# Patient Record
Sex: FEMALE | Race: BLACK OR AFRICAN AMERICAN | Employment: OTHER | URBAN - METROPOLITAN AREA
[De-identification: names, ages, dates, MRNs, and addresses within clinical notes are randomized per-mention and may not be internally consistent; named-entity substitution may affect disease eponyms.]

---

## 2020-08-12 ENCOUNTER — OFFICE VISIT (OUTPATIENT)
Dept: CARDIOLOGY CLINIC | Age: 75
End: 2020-08-12
Payer: MEDICARE

## 2020-08-12 ENCOUNTER — TELEPHONE (OUTPATIENT)
Dept: CARDIOLOGY CLINIC | Age: 75
End: 2020-08-12

## 2020-08-12 VITALS
HEIGHT: 65 IN | WEIGHT: 202 LBS | SYSTOLIC BLOOD PRESSURE: 150 MMHG | BODY MASS INDEX: 33.66 KG/M2 | TEMPERATURE: 98 F | HEART RATE: 63 BPM | DIASTOLIC BLOOD PRESSURE: 90 MMHG

## 2020-08-12 PROCEDURE — 99205 OFFICE O/P NEW HI 60 MIN: CPT | Performed by: INTERNAL MEDICINE

## 2020-08-12 PROCEDURE — 93000 ELECTROCARDIOGRAM COMPLETE: CPT | Performed by: INTERNAL MEDICINE

## 2020-08-12 RX ORDER — FEXOFENADINE HCL 180 MG/1
180 TABLET ORAL PRN
COMMUNITY

## 2020-08-12 NOTE — TELEPHONE ENCOUNTER
Patient scheduled for COVID testing 8/14/20 [facility]) for CHAI 8/20/20 ([facility]). Patient notified to self-quarantine from day of testing until procedure. She states she understands and will comply.

## 2020-08-12 NOTE — PROGRESS NOTES
OFFICE CONSULT    Name: Abdifatah Ruth     Age: 70 y.o. Date of Service: 8/12/2020    Reason for Consultation: Valvular heart disease    Referring Physician: Farnaz Dial MD    History of Present Illness:   Ms. Radha Brown is a 70 y.o. old  female who presents today for further evaluation of valvular heart disease (including \"marked aortic regurgitation on 6/2020 echocardiogram). Her PMH is outlined below (see A/P). +dyspnea on exertion with moderate levels of exertion. She denies recent chest pain, palpitations, orthopnea, or syncope. Currently with no active cardiac complaints at rest. SR on EKG.     Review of Systems:   Cardiac: As per HPI  General: No fever, chills  Pulmonary: As per HPI  HEENT: No visual disturbances, difficult swallowing  GI: No nausea, vomiting, abdominal pain  Musculoskeletal: JOYNER x 4, no focal motor deficits  Skin: Intact, no rashes  Neuro/Psych: No headache or seizures    Past Medical History:  Past Medical History:   Diagnosis Date    Heart murmur     Hypertension     Night sweats    HTN  HLD  Obesity  Valvular heart disease    Past Surgical History:  No prior cardiac surgeries    Family History:  No [address] of premature CAD    Social History:  Social History     Socioeconomic History    Marital status: Single     Spouse name: Not on file    Number of children: Not on file    Years of education: Not on file    Highest education level: Not on file   Occupational History    Not on file   Social Needs    Financial resource strain: Not on file    Food insecurity     Worry: Not on file     Inability: Not on file   [employer] needs     Medical: Not on file     Non-medical: Not on file   Tobacco Use    Smoking status: Never Smoker    Smokeless tobacco: Never Used   Substance and Sexual Activity    Alcohol use: No    Drug use: No    Sexual activity: Yes   Lifestyle    Physical activity     Days per week: Not on file     Minutes per session: Not on file  Stress: Not on file   Relationships    Social connections     Talks on phone: Not on file     Gets together: Not on file     Attends [employer] service: Not on file     Active member of club or organization: Not on file     Attends meetings of clubs or organizations: Not on file     Relationship status: Not on file    Intimate partner violence     Fear of current or ex partner: Not on file     Emotionally abused: Not on file     Physically abused: Not on file     Forced sexual activity: Not on file   Other Topics Concern    Not on file   Social History Narrative    Not on file       Allergies: Allergies   Allergen Reactions    Dye [Iodides] Hives    Pcn [Penicillins] Rash       Current Medications:  Current Outpatient Medications   Medication Sig Dispense Refill    fexofenadine (ALLEGRA) 180 MG tablet Take 180 mg by mouth as needed      ammonium lactate (LAC-HYDRIN) 12 % lotion APPLY TOPICALLY 2 TIMES A DAY  5    lisinopril (PRINIVIL;ZESTRIL) 20 MG tablet   5    metoprolol succinate (TOPROL XL) 25 MG extended release tablet TAKE 1 TABLET BY MOUTH AT BEDTIME  5    spironolactone (ALDACTONE) 25 MG tablet TAKE 1 TABLET BY MOUTH EVERY DAY  5    acetaminophen (TYLENOL) 500 MG tablet Take 500 mg by mouth every 6 hours as needed for Pain.  simvastatin (ZOCOR) 40 MG tablet Take 40 mg by mouth nightly.  Ergocalciferol (VITAMIN D2 PO) Take  by mouth once a week.  montelukast (SINGULAIR) 10 MG tablet TAKE 1 TABLET BY MOUTH AT BEDTIME  5     No current facility-administered medications for this visit.           Physical Exam:  BP (!) 150/90   Pulse 63   Temp 98 °F (36.7 °C)   Ht 5' 5\" (1.651 m)   Wt 202 lb (91.6 kg)   BMI 33.61 kg/m²   Wt Readings from Last 3 Encounters:   08/12/20 202 lb (91.6 kg)   12/10/19 201 lb 12.8 oz (91.5 kg)   12/08/17 211 lb 3.2 oz (95.8 kg)     Appearance: Awake, alert, no acute respiratory distress  Skin: Intact, no rash  Head: Normocephalic, atraumatic  Eyes: EOMI, no conjunctival erythema  ENMT: No pharyngeal erythema, MMM, no rhinorrhea  Neck: Supple, no carotid bruits  Lungs: Clear to auscultation bilaterally. No wheezes, rales, or rhonchi. Cardiac: Regular rate and rhythm, 3/6 SHORTY > RUSB, 2/6 diastolic murmur > RUSB  Abdomen: Soft, nontender, +bowel sounds  Extremities: Moves all extremities x 4, no lower extremity edema  Neurologic: No focal motor deficits apparent, normal mood and affect    Laboratory Tests:  Lab Results   Component Value Date    CREATININE 1.3 (H) 07/06/2016    BUN 17 07/06/2016     07/06/2016    K 4.2 07/06/2016     07/06/2016    CO2 25 07/06/2016     Lab Results   Component Value Date    WBC 5.4 07/06/2016    RBC 5.00 07/06/2016    HGB 12.0 07/06/2016    HCT 38.0 07/06/2016    MCV 76.1 07/06/2016    MCH 24.0 07/06/2016    MCHC 31.6 07/06/2016    RDW 16.9 07/06/2016     07/06/2016    MPV 10.7 07/06/2016     Lab Results   Component Value Date    TSH 2.450 07/06/2016     Lab Results   Component Value Date    TRIG 73 07/06/2016    TRIG 65 03/06/2015    TRIG 52 08/22/2014     Lab Results   Component Value Date    HDL 71 07/06/2016    HDL 71 03/06/2015    HDL 69 08/22/2014     Lab Results   Component Value Date    LDLCALC 73 07/06/2016    [address] 60 03/06/2015    LDLCALC 52 08/22/2014       Cardiac Tests:  ECG: SR, rate 63, NSSTT changes    Echocardiogram: 10/18/13  EF 51%, moderate septal hypertrophy, mild AS, mild MR    Exercise Nuclear Stress Test: 10/15/13  EF 53%, reversible inferior defect, fixed apical defect    Echocardiogram: 6/26/2020 (Dr. Kelly Lee -- radiologist)  Normal LV size  EF 63%  Normal RV size and function  Mild LVH  Mild MR  Moderate aortic stenosis  Marked aortic regurgitation  Mild KS  Mild TR    ASSESSMENT / PLAN:  1. Valvular heart disease (including \"marked\" aortic regurgitation and moderate aortic stenosis on 6/2020 echocardiogram)  2. Dyspnea on exertion  3.  Reported abnormal stress test in 10/2013 (per patient, she was asymptomatic at that time and she deferred further work-up)  4. HTN  5. HLD  6.  BMI 33.6    - CHAI ordered today (will perform next week at [facility])  - Discussed re: CTS referral and cardiac catheterization if significant valve disease confirmed  - Continue current medications    Crystal Acevedo MD  [facility] ([facility]) Cardiology

## 2020-08-14 ENCOUNTER — HOSPITAL ENCOUNTER (OUTPATIENT)
Age: 75
Discharge: HOME | End: 2020-08-16
Payer: MEDICARE

## 2020-08-14 PROCEDURE — U0003 INFECTIOUS AGENT DETECTION BY NUCLEIC ACID (DNA OR RNA); SEVERE ACUTE RESPIRATORY SYNDROME CORONAVIRUS 2 (SARS-COV-2) (CORONAVIRUS DISEASE [COVID-19]), AMPLIFIED PROBE TECHNIQUE, MAKING USE OF HIGH THROUGHPUT TECHNOLOGIES AS DESCRIBED BY CMS-2020-01-R: HCPCS

## 2020-08-15 LAB
SARS-COV-2: NOT DETECTED
SOURCE: NORMAL

## 2020-08-19 ENCOUNTER — TELEPHONE (OUTPATIENT)
Dept: NON INVASIVE DIAGNOSTICS | Age: 75
End: 2020-08-19

## 2020-08-20 ENCOUNTER — ANESTHESIA (OUTPATIENT)
Dept: CARDIAC CATH/INVASIVE PROCEDURES | Age: 75
End: 2020-08-20

## 2020-08-20 ENCOUNTER — ANESTHESIA EVENT (OUTPATIENT)
Dept: CARDIAC CATH/INVASIVE PROCEDURES | Age: 75
End: 2020-08-20

## 2020-08-20 ENCOUNTER — HOSPITAL ENCOUNTER (OUTPATIENT)
Dept: CARDIAC CATH/INVASIVE PROCEDURES | Age: 75
Discharge: HOME | End: 2020-08-20
Payer: MEDICARE

## 2020-08-20 VITALS
OXYGEN SATURATION: 98 % | DIASTOLIC BLOOD PRESSURE: 71 MMHG | RESPIRATION RATE: 35 BRPM | SYSTOLIC BLOOD PRESSURE: 137 MMHG

## 2020-08-20 VITALS
BODY MASS INDEX: 33.66 KG/M2 | HEIGHT: 65 IN | RESPIRATION RATE: 18 BRPM | TEMPERATURE: 97.4 F | WEIGHT: 202 LBS | SYSTOLIC BLOOD PRESSURE: 190 MMHG | DIASTOLIC BLOOD PRESSURE: 81 MMHG | OXYGEN SATURATION: 98 % | HEART RATE: 82 BPM

## 2020-08-20 LAB
LV EF: 58 %
LVEF MODALITY: NORMAL

## 2020-08-20 PROCEDURE — 2580000003 HC RX 258: Performed by: INTERNAL MEDICINE

## 2020-08-20 PROCEDURE — 93320 DOPPLER ECHO COMPLETE: CPT | Performed by: INTERNAL MEDICINE

## 2020-08-20 PROCEDURE — 3700000001 HC ADD 15 MINUTES (ANESTHESIA)

## 2020-08-20 PROCEDURE — 93312 ECHO TRANSESOPHAGEAL: CPT

## 2020-08-20 PROCEDURE — 93321 DOPPLER ECHO F-UP/LMTD STD: CPT

## 2020-08-20 PROCEDURE — 6360000002 HC RX W HCPCS: Performed by: NURSE ANESTHETIST, CERTIFIED REGISTERED

## 2020-08-20 PROCEDURE — 93312 ECHO TRANSESOPHAGEAL: CPT | Performed by: INTERNAL MEDICINE

## 2020-08-20 PROCEDURE — 93325 DOPPLER ECHO COLOR FLOW MAPG: CPT

## 2020-08-20 PROCEDURE — 3700000000 HC ANESTHESIA ATTENDED CARE

## 2020-08-20 PROCEDURE — 93325 DOPPLER ECHO COLOR FLOW MAPG: CPT | Performed by: INTERNAL MEDICINE

## 2020-08-20 PROCEDURE — 2709999900 HC NON-CHARGEABLE SUPPLY

## 2020-08-20 RX ORDER — SODIUM CHLORIDE 9 MG/ML
INJECTION, SOLUTION INTRAVENOUS CONTINUOUS
Status: DISCONTINUED | OUTPATIENT
Start: 2020-08-20 | End: 2020-08-21 | Stop reason: HOSPADM

## 2020-08-20 RX ORDER — PROPOFOL 10 MG/ML
INJECTION, EMULSION INTRAVENOUS PRN
Status: DISCONTINUED | OUTPATIENT
Start: 2020-08-20 | End: 2020-08-20 | Stop reason: SDUPTHER

## 2020-08-20 RX ADMIN — PROPOFOL 220 MG: 10 INJECTION, EMULSION INTRAVENOUS at 10:23

## 2020-08-20 RX ADMIN — SODIUM CHLORIDE: 9 INJECTION, SOLUTION INTRAVENOUS at 08:30

## 2020-08-20 NOTE — ANESTHESIA PRE PROCEDURE
Department of Anesthesiology  Preprocedure Note       Name:  Alcide Mohs   Age:  70 y.o.  :  1945                                          MRN:  [id number]         Date:  2020      Surgeon: * Surgery not found *    Procedure:     Vital Signs (Current)   Vitals:    20 0812   BP: (!) 190/81   Pulse: 82   Resp: 18   Temp: 36.3 °C (97.4 °F)   SpO2: 98%     Vital Signs Statistics (for past 48 hrs)     Temp  Av.3 °C (97.4 °F)  Min: 36.3 °C (97.4 °F)   Min taken time: 20 7557  Max: 36.3 °C (97.4 °F)   Max taken time: 20 08  Pulse  Av  Min: 82   Min taken time: 20 08  Max: 82   Max taken time: 20 0812  Resp  Av  Min: 25   Min taken time: 20 08  Max: 18   Max taken time: 20 0812  BP  Min: 190/81   Min taken time: 20 0812  Max: 190/81   Max taken time: 20 08  SpO2  Av %  Min: 98 %   Min taken time: 20 08  Max: 98 %   Max taken time: 20 0812    BP Readings from Last 3 Encounters:   20 (!) 190/81   20 (!) 150/90   12/10/19 99/61     BMI  Body mass index is 33.61 kg/m². Estimated body mass index is 33.61 kg/m² as calculated from the following:    Height as of this encounter: 5' 5\" (1.651 m). Weight as of this encounter: 202 lb (91.6 kg).     CBC   Lab Results   Component Value Date    WBC 5.4 2016    RBC 5.00 2016    HGB 12.0 2016    HCT 38.0 2016    MCV 76.1 2016    RDW 16.9 2016     2016     CMP    Lab Results   Component Value Date     2016    K 4.2 2016     2016    CO2 25 2016    BUN 17 2016    CREATININE 1.3 2016    GFRAA 49 2016    LABGLOM 49 2016    GLUCOSE 87 2016    PROT 7.6 2016    CALCIUM 9.3 2016    BILITOT 0.5 2016    ALKPHOS 81 2016    AST 20 2016    ALT 15 2016     BMP    Lab Results   Component Value Date     2016    K 4.2 spironolactone (ALDACTONE) 25 MG tablet TAKE 1 TABLET BY MOUTH EVERY DAY  5    acetaminophen (TYLENOL) 500 MG tablet Take 500 mg by mouth every 6 hours as needed for Pain.  simvastatin (ZOCOR) 40 MG tablet Take 40 mg by mouth every other day       Ergocalciferol (VITAMIN D2 PO) Take  by mouth once a week.  ammonium lactate (LAC-HYDRIN) 12 % lotion APPLY TOPICALLY 2 TIMES A DAY  5     Current Facility-Administered Medications   Medication Dose Route Frequency Provider Last Rate Last Dose    0.9 % sodium chloride infusion   Intravenous Continuous Nina Mckenzie MD 20 mL/hr at 08/20/20 0830         Allergies: Allergies   Allergen Reactions    Dye [Iodides] Hives    Pcn [Penicillins] Rash       Problem List:  There is no problem list on file for this patient. Past Medical History:        Diagnosis Date    Arthritis     Heart murmur     Hyperlipidemia     Hypertension     Night sweats        Past Surgical History:        Procedure Laterality Date    CATARACT REMOVAL WITH IMPLANT  2010    Bilateral        Social History:    Social History     Tobacco Use    Smoking status: Never Smoker    Smokeless tobacco: Never Used   Substance Use Topics    Alcohol use: No                                Counseling given: Not Answered      Vital Signs (Current):   Vitals:    08/20/20 0812   BP: (!) 190/81   Pulse: 82   Resp: 18   Temp: 36.3 °C (97.4 °F)   TempSrc: Temporal   SpO2: 98%   Weight: 202 lb (91.6 kg)   Height: 5' 5\" (1.651 m)                                              BP Readings from Last 3 Encounters:   08/20/20 (!) 190/81   08/12/20 (!) 150/90   12/10/19 99/61       NPO Status:  8/19/20 2200                                                                               BMI:   Wt Readings from Last 3 Encounters:   08/20/20 202 lb (91.6 kg)   08/12/20 202 lb (91.6 kg)   12/10/19 201 lb 12.8 oz (91.5 kg)     Body mass index is 33.61 kg/m².     CBC:   Lab Results   Component Value Date    WBC 5.4 07/06/2016    RBC 5.00 07/06/2016    HGB 12.0 07/06/2016    HCT 38.0 07/06/2016    MCV 76.1 07/06/2016    RDW 16.9 07/06/2016     07/06/2016       CMP:   Lab Results   Component Value Date     07/06/2016    K 4.2 07/06/2016     07/06/2016    CO2 25 07/06/2016    BUN 17 07/06/2016    CREATININE 1.3 07/06/2016    GFRAA 49 07/06/2016    LABGLOM 49 07/06/2016    GLUCOSE 87 07/06/2016    PROT 7.6 07/06/2016    CALCIUM 9.3 07/06/2016    BILITOT 0.5 07/06/2016    ALKPHOS 81 07/06/2016    AST 20 07/06/2016    ALT 15 07/06/2016       POC Tests: No results for input(s): POCGLU, POCNA, POCK, POCCL, POCBUN, POCHEMO, POCHCT in the last 72 hours. Coags: No results found for: PROTIME, INR, APTT    HCG (If Applicable): No results found for: PREGTESTUR, PREGSERUM, HCG, HCGQUANT     ABGs: No results found for: PHART, PO2ART, IPD1VFQ, ROB8NIA, BEART, C6CTMAOY     Type & Screen (If Applicable):  No results found for: LABABO, LABRH    Drug/Infectious Status (If Applicable):  No results found for: HIV, HEPCAB    COVID-19 Screening (If Applicable):   Lab Results   Component Value Date    COVID19 Not Detected 08/14/2020         Anesthesia Evaluation  Patient summary reviewed and Nursing notes reviewed no history of anesthetic complications:   Airway: Mallampati: II  TM distance: >3 FB   Neck ROM: full  Mouth opening: > = 3 FB Dental:          Pulmonary: breath sounds clear to auscultation                             Cardiovascular:  Exercise tolerance: poor (<4 METS),   (+) hypertension:, valvular problems/murmurs: AI, hyperlipidemia               ROS comment: SOB on exertion. Neuro/Psych:               GI/Hepatic/Renal:             Endo/Other:    (+) : arthritis:., .                 Abdominal:           Vascular:                                      Anesthesia Plan      MAC     ASA 3     (#20 R hand)        Anesthetic plan and risks discussed with patient.     Use of blood products discussed with patient whom.   Plan discussed with attending.                 DAMIEN Miles - LUIS   8/20/2020

## 2020-08-20 NOTE — ANESTHESIA POSTPROCEDURE EVALUATION
Department of Anesthesiology  Postprocedure Note    Patient: Nunu Lantigua  MRN: [id number]  YOB: 1945  Date of evaluation: 8/20/2020  Time:  10:43 AM     Procedure Summary     Date:  08/20/20 Room / [facility]; [facility]    Anesthesia Start:  3422 Anesthesia Stop:  8723    Procedure:  SEY CHAI Diagnosis:  Heart disease, unspecified    Scheduled Providers:  Ardie Homans, APRN - CRNA; Jose G Hatfield MD Responsible Provider:  Jose G Hatfield MD    Anesthesia Type:  MAC ASA Status:  3          Anesthesia Type: MAC    Murphy Phase I:      Murphy Phase II:      Last vitals: Reviewed and per EMR flowsheets.        Anesthesia Post Evaluation    Patient location during evaluation: bedside  Patient participation: complete - patient participated  Level of consciousness: awake and alert  Airway patency: patent  Nausea & Vomiting: no nausea and no vomiting  Complications: no  Cardiovascular status: hemodynamically stable and blood pressure returned to baseline  Respiratory status: acceptable  Hydration status: euvolemic

## 2020-08-20 NOTE — PROCEDURES
PRELIMINARY TRANSESOPHAGEAL ECHOCARDIOGRAPHY REPORT    Cardiologist: Dr. Mariely Williamson    Date of Procedure: 8/20/2020    Indications for study:  AI, AS    Study performed using (Sedation): Per anesthesia    Complications: None    Preliminary findings:  Normal LV function   Severe AI  Moderate AS  Mild MR    Roselyn Byrnes MD  [facility]) Cardiology

## 2020-09-08 ENCOUNTER — TELEPHONE (OUTPATIENT)
Dept: NON INVASIVE DIAGNOSTICS | Age: 75
End: 2020-09-08

## 2020-09-08 NOTE — TELEPHONE ENCOUNTER
Patient scheduled on 9/10/20, given mask requirement and temperature check  instructions. Patient answered all checklist questions and appointment confirmed.

## 2020-09-10 ENCOUNTER — HOSPITAL ENCOUNTER (OUTPATIENT)
Dept: NON INVASIVE DIAGNOSTICS | Age: 75
Discharge: HOME | End: 2020-09-10
Payer: MEDICARE

## 2020-09-10 ENCOUNTER — HOSPITAL ENCOUNTER (OUTPATIENT)
Age: 75
Discharge: HOME | End: 2020-09-10
Payer: MEDICARE

## 2020-09-10 VITALS
HEART RATE: 80 BPM | WEIGHT: 215 LBS | TEMPERATURE: 96.6 F | RESPIRATION RATE: 20 BRPM | SYSTOLIC BLOOD PRESSURE: 155 MMHG | BODY MASS INDEX: 34.55 KG/M2 | DIASTOLIC BLOOD PRESSURE: 101 MMHG | HEIGHT: 66 IN

## 2020-09-10 LAB
ALBUMIN SERPL-MCNC: 4.4 G/DL (ref 3.5–5.2)
ALP BLD-CCNC: 92 U/L (ref 35–104)
ALT SERPL-CCNC: 19 U/L (ref 0–32)
ANION GAP SERPL CALCULATED.3IONS-SCNC: 16 MMOL/L (ref 7–16)
AST SERPL-CCNC: 19 U/L (ref 0–31)
BILIRUB SERPL-MCNC: 0.4 MG/DL (ref 0–1.2)
BUN BLDV-MCNC: 20 MG/DL (ref 8–23)
CALCIUM SERPL-MCNC: 10 MG/DL (ref 8.6–10.2)
CHLORIDE BLD-SCNC: 106 MMOL/L (ref 98–107)
CO2: 23 MMOL/L (ref 22–29)
CREAT SERPL-MCNC: 1.4 MG/DL (ref 0.5–1)
GFR AFRICAN AMERICAN: 45
GFR NON-AFRICAN AMERICAN: 45 ML/MIN/1.73
GLUCOSE BLD-MCNC: 100 MG/DL (ref 74–99)
HCT VFR BLD CALC: 39.9 % (ref 34–48)
HEMOGLOBIN: 12.2 G/DL (ref 11.5–15.5)
INR BLD: 1
MCH RBC QN AUTO: 24.8 PG (ref 26–35)
MCHC RBC AUTO-ENTMCNC: 30.6 % (ref 32–34.5)
MCV RBC AUTO: 81.1 FL (ref 80–99.9)
PDW BLD-RTO: 15.9 FL (ref 11.5–15)
PLATELET # BLD: 256 E9/L (ref 130–450)
PMV BLD AUTO: 11.3 FL (ref 7–12)
POTASSIUM SERPL-SCNC: 4.5 MMOL/L (ref 3.5–5)
PROTHROMBIN TIME: 11.7 SEC (ref 9.3–12.4)
RBC # BLD: 4.92 E12/L (ref 3.5–5.5)
SODIUM BLD-SCNC: 145 MMOL/L (ref 132–146)
TOTAL PROTEIN: 7.8 G/DL (ref 6.4–8.3)
WBC # BLD: 8.2 E9/L (ref 4.5–11.5)

## 2020-09-10 PROCEDURE — 85027 COMPLETE CBC AUTOMATED: CPT

## 2020-09-10 PROCEDURE — 99214 OFFICE O/P EST MOD 30 MIN: CPT | Performed by: INTERNAL MEDICINE

## 2020-09-10 PROCEDURE — 99211 OFF/OP EST MAY X REQ PHY/QHP: CPT

## 2020-09-10 PROCEDURE — 80053 COMPREHEN METABOLIC PANEL: CPT

## 2020-09-10 PROCEDURE — 36415 COLL VENOUS BLD VENIPUNCTURE: CPT

## 2020-09-10 PROCEDURE — 99204 OFFICE O/P NEW MOD 45 MIN: CPT | Performed by: THORACIC SURGERY (CARDIOTHORACIC VASCULAR SURGERY)

## 2020-09-10 PROCEDURE — 85610 PROTHROMBIN TIME: CPT

## 2020-09-10 NOTE — PROGRESS NOTES
[facility]  Visit Note      Patient name: Geo Chinchilla    Reason for consult: Aortic valve disease    Referring Physician: Dr. Akshat Sadler    Primary Care Physician: Tyron Clayton MD    Date of service: 9/10/2020    Chief Complaint: limited functional capacity    HPI: This is a pleasant 79-year-old -American female whom we are seeing for aortic valve disease. She was last seen by Dr. Akshat Sadler last month. She notes decreased exertional tolerance over the past 1 or 2 years. As a example she says that she cannot cut her grass anymore which she was able to do a year ago. She also cannot walk around as far and is limited to maybe 1 block currently due to dyspnea. She has no dyspnea at rest and no orthopnea or PND or lower extremity edema. She has no palpitations. She has orthostatic lightheadedness. She has no chest discomfort. She has no history of stroke or diabetes. A focused history review includes:  1. Hypertension  2. CKD with baseline creatinine of 1.3 as of 2016  3. Hyperlipidemia  4. Obesity    Allergies: Allergies   Allergen Reactions    Dye [Iodides] Hives    Pcn [Penicillins] Rash       Home medications:    Current Outpatient Medications   Medication Sig Dispense Refill    fexofenadine (ALLEGRA) 180 MG tablet Take 180 mg by mouth as needed      ammonium lactate (LAC-HYDRIN) 12 % lotion APPLY TOPICALLY 2 TIMES A DAY  5    lisinopril (PRINIVIL;ZESTRIL) 20 MG tablet   5    metoprolol succinate (TOPROL XL) 25 MG extended release tablet TAKE 1 TABLET BY MOUTH AT BEDTIME  5    spironolactone (ALDACTONE) 25 MG tablet TAKE 1 TABLET BY MOUTH EVERY DAY  5    acetaminophen (TYLENOL) 500 MG tablet Take 500 mg by mouth every 6 hours as needed for Pain.  simvastatin (ZOCOR) 40 MG tablet Take 40 mg by mouth every other day       Ergocalciferol (VITAMIN D2 PO) Take  by mouth once a week.        No current facility-administered medications for this encounter. Past Medical History:  Past Medical History:   Diagnosis Date    Aortic insufficiency 06/2020    Aortic stenosis 06/2020    Arthritis     Heart murmur     Hyperlipidemia     Hypertension     Night sweats        Past Surgical History:  Past Surgical History:   Procedure Laterality Date    CATARACT REMOVAL WITH IMPLANT  2010    Bilateral     TRANSESOPHAGEAL ECHOCARDIOGRAM  08/20/2020    DR Ocampo       Social History:  Social History     Socioeconomic History    Marital status: Single     Spouse name: Not on file    Number of children: Not on file    Years of education: Not on file    Highest education level: Not on file   Occupational History    Not on file   Social Needs    Financial resource strain: Not on file    Food insecurity     Worry: Not on file     Inability: Not on file    Transportation needs     Medical: Not on file     Non-medical: Not on file   Tobacco Use    Smoking status: Never Smoker    Smokeless tobacco: Never Used   Substance and Sexual Activity    Alcohol use: No    Drug use: No    Sexual activity: Yes   Lifestyle    Physical activity     Days per week: Not on file     Minutes per session: Not on file    Stress: Not on file   Relationships    Social connections     Talks on phone: Not on file     Gets together: Not on file     Attends [employer] service: Not on file     Active member of club or organization: Not on file     Attends meetings of clubs or organizations: Not on file     Relationship status: Not on file    Intimate partner violence     Fear of current or ex partner: Not on file     Emotionally abused: Not on file     Physically abused: Not on file     Forced sexual activity: Not on file   Other Topics Concern    Not on file   Social History Narrative    Not on file       Family History:  Family History   Family history unknown: Yes       Review of Systems:  Constitutional: Denies fevers, chills, or weight loss.   HEENT: Denies visual changes or hearing loss. Heart: As per HPI. Lungs: Denies shortness of breath, cough, or wheezing. Gastrointestinal: Denies nausea, vomiting, constipation, or diarrhea. Genitourinary: dysuria or hematuria. Psychiatric: Patient denies anxiety or depression. Neurologic: Patient denies weakness of the extremities, dizziness, or headaches. All other ROS checked and found to be negative. Objective:  Vitals BP (!) 155/101 (Site: Right Upper Arm)   Pulse 80   Temp 96.6 °F (35.9 °C) (Temporal)   Resp 20   Ht 5' 5.5\" (1.664 m)   Wt 215 lb (97.5 kg)   BMI 35.23 kg/m²   General Appearance: Pleasant 70y.o. year old female who appears stated age. Communicates well, no acute distress. HEENT: Head is normocephalic, atraumatic. EOMs intact, PERRL. Trachea midline. Lungs: Normal respiratory rate and normal effort. She is not in respiratory distress. Breath sounds clear to auscultation. No wheezes. Heart: Normal rate. Regular rhythm. 3/6 mid peaking systolic ejection murmur over the upper right sternal border with a muffled S2. There is a brief 1/6 early diastolic murmur over the left sternal border. Chest: Symmetric chest wall expansion. Extremities: Normal range of motion. Neurological: Patient is alert and oriented to person, place and time. Patient has normal reflexes. Skin: Warm and dry. Abdomen: Abdomen is soft and non-distended. Bowel sounds are normal. There is no abdominal tenderness tenderness. There is no guarding. There is no mass. Pulses: Distal pulses are intact. Skin: Warm and dry without lesions.     Lab Results   Component Value Date     07/06/2016    K 4.2 07/06/2016     07/06/2016    CO2 25 07/06/2016    BUN 17 07/06/2016    CREATININE 1.3 (H) 07/06/2016    GLUCOSE 87 07/06/2016    CALCIUM 9.3 07/06/2016    PROT 7.6 07/06/2016    LABALBU 4.0 07/06/2016    BILITOT 0.5 07/06/2016    ALKPHOS 81 07/06/2016    AST 20 07/06/2016    ALT 15 07/06/2016    LABGLOM 49

## 2020-09-10 NOTE — PROGRESS NOTES
The [address] [facility]  Visit Note      Patient name: Hafsa Barlow    Reason for consult: AS/AI    Referring Physician: Dr. Erika Pulido    Primary Care Physician: Marly Ivey MD    Date of service: 9/10/2020    Chief Complaint: GARCIA    HPI: 70year old female who was working in the yard and developed GARCIA which was relieved by rest and a fan on her face. She states this has only happened once and has no other issues. She had an echo which showed moderate AS and marked AI and CHAI done by Dr. Erika Pulido showed moderate AS with severe AI. She denies CP, SOB at rest, LE edema, N/V, F/C, night sweats, orthopnea, PND, and syncope. Allergies: Allergies   Allergen Reactions    Dye [Iodides] Hives    Pcn [Penicillins] Rash       Home medications:    Current Outpatient Medications   Medication Sig Dispense Refill    fexofenadine (ALLEGRA) 180 MG tablet Take 180 mg by mouth as needed      ammonium lactate (LAC-HYDRIN) 12 % lotion APPLY TOPICALLY 2 TIMES A DAY  5    lisinopril (PRINIVIL;ZESTRIL) 20 MG tablet   5    metoprolol succinate (TOPROL XL) 25 MG extended release tablet TAKE 1 TABLET BY MOUTH AT BEDTIME  5    spironolactone (ALDACTONE) 25 MG tablet TAKE 1 TABLET BY MOUTH EVERY DAY  5    acetaminophen (TYLENOL) 500 MG tablet Take 500 mg by mouth every 6 hours as needed for Pain.  simvastatin (ZOCOR) 40 MG tablet Take 40 mg by mouth every other day       Ergocalciferol (VITAMIN D2 PO) Take  by mouth once a week. No current facility-administered medications for this encounter.         Past Medical History:  Past Medical History:   Diagnosis Date    Aortic insufficiency 06/2020    Aortic stenosis 06/2020    Arthritis     Heart murmur     Hyperlipidemia     Hypertension     Night sweats        Past Surgical History:  Past Surgical History:   Procedure Laterality Date    CATARACT REMOVAL WITH IMPLANT  2010    Bilateral     TRANSESOPHAGEAL ECHOCARDIOGRAM  08/20/2020    DR Ocampo Social History:  Social History     Socioeconomic History    Marital status: Single     Spouse name: Not on file    Number of children: Not on file    Years of education: Not on file    Highest education level: Not on file   Occupational History    Not on file   Social Needs    Financial resource strain: Not on file    Food insecurity     Worry: Not on file     Inability: Not on file    Transportation needs     Medical: Not on file     Non-medical: Not on file   Tobacco Use    Smoking status: Never Smoker    Smokeless tobacco: Never Used   Substance and Sexual Activity    Alcohol use: No    Drug use: No    Sexual activity: Yes   Lifestyle    Physical activity     Days per week: Not on file     Minutes per session: Not on file    Stress: Not on file   Relationships    Social connections     Talks on phone: Not on file     Gets together: Not on file     Attends Baptism service: Not on file     Active member of club or organization: Not on file     Attends meetings of clubs or organizations: Not on file     Relationship status: Not on file    Intimate partner violence     Fear of current or ex partner: Not on file     Emotionally abused: Not on file     Physically abused: Not on file     Forced sexual activity: Not on file   Other Topics Concern    Not on file   Social History Narrative    Not on file       Family History:  Family History   Family history unknown: Yes       Review of Systems:  Constitutional: Denies fevers, chills, or weight loss. HEENT: Denies visual changes or hearing loss. Heart: As per HPI. Lungs: Denies shortness of breath, cough, or wheezing. Gastrointestinal: Denies nausea, vomiting, constipation, or diarrhea. Genitourinary: dysuria or hematuria. Psychiatric: Patient denies anxiety or depression. Neurologic: Patient denies weakness of the extremities, dizziness, or headaches. All other ROS checked and found to be negative.     Objective:  Vitals BP (!) 155/101 (Site: Right Upper Arm)   Pulse 80   Temp 96.6 °F (35.9 °C) (Temporal)   Resp 20   Ht 5' 5.5\" (1.664 m)   Wt 215 lb (97.5 kg)   BMI 35.23 kg/m²   General Appearance: Pleasant 70y.o. year old female who appears stated age. Communicates well, no acute distress. HEENT: Head is normocephalic, atraumatic. EOMs intact, PERRL. Trachea midline. Lungs: Normal respiratory rate and normal effort. She is not in respiratory distress. Breath sounds clear to auscultation. No wheezes. Heart: Normal rate. Regular rhythm. S1 normal and S2 normal. Positive for murmur. Chest: Symmetric chest wall expansion. Extremities: Normal range of motion. Neurological: Patient is alert and oriented to person, place and time. Patient has normal reflexes. Skin: Warm and dry. Abdomen: Abdomen is soft and non-distended. Bowel sounds are normal. There is no abdominal tenderness tenderness. There is no guarding. There is no mass. Pulses: Distal pulses are intact. Skin: Warm and dry without lesions. Assessment: AS/AI        Plan: Full pre op work up including cath, CTs, carotids, BRIA, and PFT then heart team discussion.         Electronically signed by Carlos Chau MD on 9/10/2020 at 1:30 PM

## 2020-09-16 RX ORDER — ASPIRIN 325 MG
325 TABLET, DELAYED RELEASE (ENTERIC COATED) ORAL SEE ADMIN INSTRUCTIONS
Qty: 1 TABLET | Refills: 0 | Status: SHIPPED
Start: 2020-09-16 | End: 2020-11-12

## 2020-09-16 RX ORDER — PREDNISONE 50 MG/1
50 TABLET ORAL SEE ADMIN INSTRUCTIONS
Qty: 3 TABLET | Refills: 0 | Status: SHIPPED
Start: 2020-09-16 | End: 2020-11-11 | Stop reason: SDUPTHER

## 2020-09-16 RX ORDER — DIPHENHYDRAMINE HCL 25 MG
50 TABLET ORAL SEE ADMIN INSTRUCTIONS
Qty: 2 TABLET | Refills: 0 | Status: SHIPPED
Start: 2020-09-16 | End: 2020-11-11 | Stop reason: SDUPTHER

## 2020-09-18 ENCOUNTER — TELEPHONE (OUTPATIENT)
Dept: CARDIOLOGY CLINIC | Age: 75
End: 2020-09-18

## 2020-09-18 ENCOUNTER — TELEPHONE (OUTPATIENT)
Dept: CARDIOLOGY | Age: 75
End: 2020-09-18

## 2020-09-18 NOTE — TELEPHONE ENCOUNTER
Cheikh Lezama called office  Needs teeth extraction Monday 9 21    Has LHC on Friday 9 25    Please advise her  If this is ok and any other orders       (AS- TAVR workup )  Thank you  Yesica Ghosh

## 2020-09-18 NOTE — TELEPHONE ENCOUNTER
Left  for patient - ok for tooth extraction on Monday (9/21/20) and heart cath on Friday (9/25/20). Advised her to call with any further questions; otherwise I will be in touch to schedule further testing once approved by insurance.

## 2020-09-25 ENCOUNTER — HOSPITAL ENCOUNTER (OUTPATIENT)
Dept: CARDIAC CATH/INVASIVE PROCEDURES | Age: 75
Discharge: HOME | End: 2020-09-25
Payer: MEDICARE

## 2020-09-25 VITALS
OXYGEN SATURATION: 96 % | WEIGHT: 197 LBS | HEIGHT: 65 IN | BODY MASS INDEX: 32.82 KG/M2 | TEMPERATURE: 96.7 F | DIASTOLIC BLOOD PRESSURE: 55 MMHG | RESPIRATION RATE: 16 BRPM | HEART RATE: 63 BPM | SYSTOLIC BLOOD PRESSURE: 138 MMHG

## 2020-09-25 LAB
ABO/RH: NORMAL
ANTIBODY SCREEN: NORMAL

## 2020-09-25 PROCEDURE — 93458 L HRT ARTERY/VENTRICLE ANGIO: CPT

## 2020-09-25 PROCEDURE — 86901 BLOOD TYPING SEROLOGIC RH(D): CPT

## 2020-09-25 PROCEDURE — 86850 RBC ANTIBODY SCREEN: CPT

## 2020-09-25 PROCEDURE — 93454 CORONARY ARTERY ANGIO S&I: CPT | Performed by: INTERNAL MEDICINE

## 2020-09-25 PROCEDURE — 36415 COLL VENOUS BLD VENIPUNCTURE: CPT

## 2020-09-25 PROCEDURE — 2709999900 HC NON-CHARGEABLE SUPPLY

## 2020-09-25 PROCEDURE — 86900 BLOOD TYPING SEROLOGIC ABO: CPT

## 2020-09-25 PROCEDURE — C1725 CATH, TRANSLUMIN NON-LASER: HCPCS

## 2020-09-25 PROCEDURE — C1769 GUIDE WIRE: HCPCS

## 2020-09-25 PROCEDURE — 6360000002 HC RX W HCPCS

## 2020-09-25 PROCEDURE — C1894 INTRO/SHEATH, NON-LASER: HCPCS

## 2020-09-25 PROCEDURE — 2500000003 HC RX 250 WO HCPCS

## 2020-09-25 RX ORDER — ACETAMINOPHEN 325 MG/1
650 TABLET ORAL EVERY 4 HOURS PRN
Status: DISCONTINUED | OUTPATIENT
Start: 2020-09-25 | End: 2020-09-26 | Stop reason: HOSPADM

## 2020-09-25 RX ORDER — SODIUM CHLORIDE 0.9 % (FLUSH) 0.9 %
10 SYRINGE (ML) INJECTION PRN
Status: DISCONTINUED | OUTPATIENT
Start: 2020-09-25 | End: 2020-09-26 | Stop reason: HOSPADM

## 2020-09-25 RX ORDER — SODIUM CHLORIDE 9 MG/ML
INJECTION, SOLUTION INTRAVENOUS ONCE
Status: DISCONTINUED | OUTPATIENT
Start: 2020-09-25 | End: 2020-09-26 | Stop reason: HOSPADM

## 2020-09-25 RX ORDER — SODIUM CHLORIDE 0.9 % (FLUSH) 0.9 %
10 SYRINGE (ML) INJECTION EVERY 12 HOURS SCHEDULED
Status: DISCONTINUED | OUTPATIENT
Start: 2020-09-25 | End: 2020-09-26 | Stop reason: HOSPADM

## 2020-09-25 NOTE — PROGRESS NOTES
Patient tolerated three packs of violette crackers and 120 cc of gingerale. Feels comfortable and doesn't wish to eat anything else at this time. Daughter in waiting room.

## 2020-09-25 NOTE — PROCEDURES
2. Heart team discussion regarding SAVR vs TAVR depending on results of CT scan.   Of note the patient is very adamant against open heart surgery      John Shaffer MD  Interventional Cardiology/Structural Heart Disease  Cell: [phone]

## 2020-09-25 NOTE — PROGRESS NOTES
Patient post heart cath right radial approach. Safety devices in place. Patient did not wish to order a breakfast try but wanted just violette crackers and gingerale. Denies pain. Reviewed post cath instructions regarding not using her right hand and wrist. Verbalized understanding.

## 2020-09-25 NOTE — PROGRESS NOTES
Discharge instructions reviewed with patient and daughter. Both verbalized understanding. No bleeding noted from right wrist.  Wrist immobilizer intact. Fingers warm with good sensation. Denies pain. Wheeled patient to car. Daughter driving.

## 2020-09-28 RX ORDER — DIPHENHYDRAMINE HCL 25 MG
50 TABLET ORAL ONCE
Qty: 2 TABLET | Refills: 0 | Status: SHIPPED | OUTPATIENT
Start: 2020-09-28 | End: 2020-09-28

## 2020-09-28 RX ORDER — PREDNISONE 50 MG/1
50 TABLET ORAL EVERY 6 HOURS
Qty: 3 TABLET | Refills: 0 | Status: SHIPPED | OUTPATIENT
Start: 2020-09-28 | End: 2020-09-29

## 2020-09-29 ENCOUNTER — TELEPHONE (OUTPATIENT)
Dept: CARDIOLOGY | Age: 75
End: 2020-09-29

## 2020-09-29 NOTE — TELEPHONE ENCOUNTER
Spoke to patient to schedule TAVR CT scans. She wishes to wait a couple of weeks from now, stating \"I think things are moving too quickly. \" she also wishes to delay TAVR/SAVR to November when her daughter is able to be in town. Will schedule CT for mid October and call her with date/time.

## 2020-10-13 ENCOUNTER — TELEPHONE (OUTPATIENT)
Dept: CARDIOLOGY | Age: 75
End: 2020-10-13

## 2020-10-13 NOTE — TELEPHONE ENCOUNTER
Patient notified of TAVR CT scans scheduled 10/15/20. Instructed to enter main lobby, report to registration 0800, CT 0830. NPO after midnight. Reviewed dye allergy premedication: prednisone 50 mg at 1930 10/14, 1230 10/15, 0730 10/15. bendryl 50 mg 0730 10/15/20. She states understanding.

## 2020-10-15 ENCOUNTER — HOSPITAL ENCOUNTER (OUTPATIENT)
Dept: CT IMAGING | Age: 75
Discharge: HOME | End: 2020-10-17
Payer: MEDICARE

## 2020-10-15 PROCEDURE — 71275 CT ANGIOGRAPHY CHEST: CPT

## 2020-10-15 PROCEDURE — 6360000004 HC RX CONTRAST MEDICATION: Performed by: RADIOLOGY

## 2020-10-15 PROCEDURE — 75572 CT HRT W/3D IMAGE: CPT

## 2020-10-15 PROCEDURE — 2580000003 HC RX 258: Performed by: RADIOLOGY

## 2020-10-15 PROCEDURE — 74174 CTA ABD&PLVS W/CONTRAST: CPT

## 2020-10-15 RX ORDER — SODIUM CHLORIDE 0.9 % (FLUSH) 0.9 %
10 SYRINGE (ML) INJECTION ONCE
Status: COMPLETED | OUTPATIENT
Start: 2020-10-15 | End: 2020-10-15

## 2020-10-15 RX ADMIN — Medication 10 ML: at 08:45

## 2020-10-15 RX ADMIN — IOPAMIDOL 100 ML: 755 INJECTION, SOLUTION INTRAVENOUS at 08:45

## 2020-10-15 NOTE — RESULT ENCOUNTER NOTE
TAVR CTAs personally reviewed:  Calcification: Mild to moderate leaflet calcification  Annular area: 536  Annular perimeter: 84  Annular dimensions: 23.5x28.7  LVOT dimensions: 22.5x27.8, area 513  - no calcium  Sinus of Valsalva dimensions: 34.1x35.4x35.6  STJ dimensions: 31.6x34.1  STJ height: 22.1  Left coronary height: 11  Right coronary height: 17.8  Implant angle: LAO2/CAU4  Annular angle to the horizontal plane: 46 degrees    Right iliofemoral: adequate  Left iliofemoral: adequate    Plan: 26-mm YAQUEILN 3 Ultra (nominal+2cc) via RIF cutdown

## 2020-11-09 NOTE — PROGRESS NOTES
Patient having covid testing at [facility] site on 11/13/20. Patient asked to bring ID and to self quarantine until day of procedure.

## 2020-11-11 ENCOUNTER — TELEPHONE (OUTPATIENT)
Dept: CARDIOLOGY | Age: 75
End: 2020-11-11

## 2020-11-11 RX ORDER — PREDNISONE 50 MG/1
TABLET ORAL
Qty: 3 TABLET | Refills: 0 | Status: SHIPPED | OUTPATIENT
Start: 2020-11-11 | End: 2020-11-12

## 2020-11-11 RX ORDER — DIPHENHYDRAMINE HCL 50 MG
CAPSULE ORAL
Qty: 1 CAPSULE | Refills: 0 | Status: SHIPPED | OUTPATIENT
Start: 2020-11-11 | End: 2020-11-12

## 2020-11-11 NOTE — PROGRESS NOTES
Patient notified of dye premedication (prednisone and diphenhydramine) escribed to pharmacy for TAVR scheduled 11/18/20. Also reviewed PAT and preop testing scheduled 11/16/20. She will call with any concerns in the meantime.

## 2020-11-11 NOTE — TELEPHONE ENCOUNTER
Spoke to nurse at Dr. Emile Crowley office re: soft tissue mass noted on CTA pelvis 10/15/20. She is able to see report in Epic and will be addressed at patient's office visit scheduled in December. Patient aware of findings as well.

## 2020-11-12 RX ORDER — ERGOCALCIFEROL 1.25 MG/1
50000 CAPSULE ORAL WEEKLY
COMMUNITY

## 2020-11-12 NOTE — PROGRESS NOTES
Stefan[facility] PRE-ADMISSION TESTING GENERAL INSTRUCTIONS- [facility]-phone number:[phone]    GENERAL INSTRUCTIONS. [x] Nothing by mouth after midnight, including gum, candy, mints, or water. [x] You may brush your teeth, gargle, but do NOT swallow water. [x]Hibiclens shower  the night before and the morning of surgery. Do not use             Hibiclens on your face or head. [x]No smoking, chewing tobacco, illegal drugs, or alcohol within 24 hours of your surgery. [x] Jewelry, valuables or body piercing's should not be brought to the hospital. All body and/or tongue piercing's must be removed prior to arriving to hospital.  ALL hair pins must be removed. [x] Do not wear makeup, lotions, powders, deodorant. Nail polish as directed by the nurse. [x] Arrange transportation with a responsible adult  to and from the hospital.   [x] Transfusion Bracelet: Please bring with you to hospital, day of surgery  [x] Bring copy of living will or healthcare power of  papers to be placed in your electronic record     PARKING INSTRUCTIONS:   [x] Arrival Time:_0500 for surgery 11-18 Wednesday with dr Ceasar Noyola / dr Gayle Plane [address] door          [x] To reach the [facility] from [facility], upon entering the hospital, take elevator B to the 3rd floor. EDUCATION INSTRUCTIONS:   .   [x] Pre-admission Testing educational folder given  [x] Incentive Spirometry,coughing & deep breathing exercises reviewed. [x]Fluoroscopy-Xray used in surgery reviewed with patient. Educational pamphlet placed in chart. [x]Pain: Post-op pain is normal and to be expected. You will be asked to rate your pain from 0-10(a zero is not acceptable-education is needed).  Your post-op pain goal is:  [x] Ask your nurse for your pain medication  [] Other:___________________________    MEDICATION INSTRUCTIONS:   [x]Bring a complete list of your medications, please write the last time you took the medicine, give

## 2020-11-13 ENCOUNTER — HOSPITAL ENCOUNTER (OUTPATIENT)
Age: 75
Discharge: HOME | End: 2020-11-15
Payer: MEDICARE

## 2020-11-13 ENCOUNTER — PREP FOR PROCEDURE (OUTPATIENT)
Dept: CARDIOLOGY | Age: 75
End: 2020-11-13

## 2020-11-13 PROCEDURE — U0003 INFECTIOUS AGENT DETECTION BY NUCLEIC ACID (DNA OR RNA); SEVERE ACUTE RESPIRATORY SYNDROME CORONAVIRUS 2 (SARS-COV-2) (CORONAVIRUS DISEASE [COVID-19]), AMPLIFIED PROBE TECHNIQUE, MAKING USE OF HIGH THROUGHPUT TECHNOLOGIES AS DESCRIBED BY CMS-2020-01-R: HCPCS

## 2020-11-13 RX ORDER — SODIUM CHLORIDE 0.9 % (FLUSH) 0.9 %
10 SYRINGE (ML) INJECTION EVERY 12 HOURS SCHEDULED
Status: CANCELLED | OUTPATIENT
Start: 2020-11-13

## 2020-11-13 RX ORDER — CHLORHEXIDINE GLUCONATE 4 G/100ML
SOLUTION TOPICAL ONCE
Status: CANCELLED | OUTPATIENT
Start: 2020-11-13 | End: 2020-11-13

## 2020-11-13 RX ORDER — SODIUM CHLORIDE 9 MG/ML
INJECTION, SOLUTION INTRAVENOUS CONTINUOUS
Status: CANCELLED | OUTPATIENT
Start: 2020-11-13

## 2020-11-13 RX ORDER — CHLORHEXIDINE GLUCONATE 0.12 MG/ML
15 RINSE ORAL ONCE
Status: CANCELLED | OUTPATIENT
Start: 2020-11-13 | End: 2020-11-13

## 2020-11-13 RX ORDER — SODIUM CHLORIDE 0.9 % (FLUSH) 0.9 %
10 SYRINGE (ML) INJECTION PRN
Status: CANCELLED | OUTPATIENT
Start: 2020-11-13

## 2020-11-15 LAB
SARS-COV-2: NOT DETECTED
SOURCE: NORMAL

## 2020-11-16 ENCOUNTER — HOSPITAL ENCOUNTER (OUTPATIENT)
Dept: PULMONOLOGY | Age: 75
Discharge: HOME | DRG: 267 | End: 2020-11-16
Payer: MEDICARE

## 2020-11-16 ENCOUNTER — HOSPITAL ENCOUNTER (OUTPATIENT)
Dept: ULTRASOUND IMAGING | Age: 75
Discharge: HOME | DRG: 267 | End: 2020-11-18
Payer: MEDICARE

## 2020-11-16 ENCOUNTER — HOSPITAL ENCOUNTER (OUTPATIENT)
Dept: GENERAL RADIOLOGY | Age: 75
Discharge: HOME | DRG: 267 | End: 2020-11-18
Payer: MEDICARE

## 2020-11-16 ENCOUNTER — HOSPITAL ENCOUNTER (OUTPATIENT)
Dept: PREADMISSION TESTING | Age: 75
Setting detail: SURGERY ADMIT
Discharge: HOME | DRG: 267 | End: 2020-11-16
Payer: MEDICARE

## 2020-11-16 VITALS
RESPIRATION RATE: 16 BRPM | HEIGHT: 65 IN | HEART RATE: 85 BPM | WEIGHT: 199.38 LBS | TEMPERATURE: 98.3 F | BODY MASS INDEX: 33.22 KG/M2 | DIASTOLIC BLOOD PRESSURE: 60 MMHG | SYSTOLIC BLOOD PRESSURE: 129 MMHG | OXYGEN SATURATION: 99 %

## 2020-11-16 LAB
ABO/RH: NORMAL
ALBUMIN SERPL-MCNC: 4 G/DL (ref 3.5–5.2)
ALP BLD-CCNC: 86 U/L (ref 35–104)
ALT SERPL-CCNC: 13 U/L (ref 0–32)
ANION GAP SERPL CALCULATED.3IONS-SCNC: 12 MMOL/L (ref 7–16)
ANTIBODY SCREEN: NORMAL
APTT: 29.4 SEC (ref 24.5–35.1)
AST SERPL-CCNC: 17 U/L (ref 0–31)
BILIRUB SERPL-MCNC: 0.4 MG/DL (ref 0–1.2)
BILIRUBIN URINE: NEGATIVE
BLOOD, URINE: NEGATIVE
BUN BLDV-MCNC: 18 MG/DL (ref 8–23)
CALCIUM SERPL-MCNC: 9.6 MG/DL (ref 8.6–10.2)
CHLORIDE BLD-SCNC: 106 MMOL/L (ref 98–107)
CLARITY: CLEAR
CO2: 26 MMOL/L (ref 22–29)
COLOR: YELLOW
CREAT SERPL-MCNC: 1.3 MG/DL (ref 0.5–1)
GFR AFRICAN AMERICAN: 48
GFR NON-AFRICAN AMERICAN: 48 ML/MIN/1.73
GLUCOSE BLD-MCNC: 119 MG/DL (ref 74–99)
GLUCOSE URINE: NEGATIVE MG/DL
HBA1C MFR BLD: 6.1 % (ref 4–5.6)
HCT VFR BLD CALC: 38.8 % (ref 34–48)
HEMOGLOBIN: 12.1 G/DL (ref 11.5–15.5)
INR BLD: 1
KETONES, URINE: ABNORMAL MG/DL
LEUKOCYTE ESTERASE, URINE: NEGATIVE
MCH RBC QN AUTO: 24.8 PG (ref 26–35)
MCHC RBC AUTO-ENTMCNC: 31.2 % (ref 32–34.5)
MCV RBC AUTO: 79.5 FL (ref 80–99.9)
NITRITE, URINE: NEGATIVE
PDW BLD-RTO: 15.5 FL (ref 11.5–15)
PH UA: 5.5 (ref 5–9)
PLATELET # BLD: 249 E9/L (ref 130–450)
PMV BLD AUTO: 11.5 FL (ref 7–12)
POTASSIUM SERPL-SCNC: 4.1 MMOL/L (ref 3.5–5)
PROTEIN UA: NEGATIVE MG/DL
PROTHROMBIN TIME: 11.4 SEC (ref 9.3–12.4)
RBC # BLD: 4.88 E12/L (ref 3.5–5.5)
SODIUM BLD-SCNC: 144 MMOL/L (ref 132–146)
SPECIFIC GRAVITY UA: >=1.03 (ref 1–1.03)
TOTAL PROTEIN: 7.6 G/DL (ref 6.4–8.3)
UROBILINOGEN, URINE: 0.2 E.U./DL
WBC # BLD: 7.8 E9/L (ref 4.5–11.5)

## 2020-11-16 PROCEDURE — 93880 EXTRACRANIAL BILAT STUDY: CPT | Performed by: RADIOLOGY

## 2020-11-16 PROCEDURE — 94010 BREATHING CAPACITY TEST: CPT | Performed by: INTERNAL MEDICINE

## 2020-11-16 PROCEDURE — 81003 URINALYSIS AUTO W/O SCOPE: CPT

## 2020-11-16 PROCEDURE — 94375 RESPIRATORY FLOW VOLUME LOOP: CPT

## 2020-11-16 PROCEDURE — 71046 X-RAY EXAM CHEST 2 VIEWS: CPT

## 2020-11-16 PROCEDURE — 93880 EXTRACRANIAL BILAT STUDY: CPT

## 2020-11-16 PROCEDURE — 85730 THROMBOPLASTIN TIME PARTIAL: CPT

## 2020-11-16 PROCEDURE — 93005 ELECTROCARDIOGRAM TRACING: CPT

## 2020-11-16 PROCEDURE — 85027 COMPLETE CBC AUTOMATED: CPT

## 2020-11-16 PROCEDURE — 80053 COMPREHEN METABOLIC PANEL: CPT

## 2020-11-16 PROCEDURE — 85610 PROTHROMBIN TIME: CPT

## 2020-11-16 PROCEDURE — 36415 COLL VENOUS BLD VENIPUNCTURE: CPT

## 2020-11-16 PROCEDURE — 94729 DIFFUSING CAPACITY: CPT

## 2020-11-16 PROCEDURE — 86901 BLOOD TYPING SEROLOGIC RH(D): CPT

## 2020-11-16 PROCEDURE — 86850 RBC ANTIBODY SCREEN: CPT

## 2020-11-16 PROCEDURE — 86900 BLOOD TYPING SEROLOGIC ABO: CPT

## 2020-11-16 PROCEDURE — 87081 CULTURE SCREEN ONLY: CPT

## 2020-11-16 PROCEDURE — 83036 HEMOGLOBIN GLYCOSYLATED A1C: CPT

## 2020-11-16 PROCEDURE — 86923 COMPATIBILITY TEST ELECTRIC: CPT

## 2020-11-16 PROCEDURE — 87088 URINE BACTERIA CULTURE: CPT

## 2020-11-16 RX ORDER — DIPHENHYDRAMINE HCL 25 MG
25 CAPSULE ORAL ONCE
COMMUNITY
End: 2021-11-23 | Stop reason: ALTCHOICE

## 2020-11-17 ENCOUNTER — TELEPHONE (OUTPATIENT)
Dept: CARDIOLOGY | Age: 75
End: 2020-11-17

## 2020-11-17 ENCOUNTER — ANESTHESIA EVENT (OUTPATIENT)
Dept: OPERATING ROOM | Age: 75
DRG: 267 | End: 2020-11-17
Payer: MEDICARE

## 2020-11-17 LAB
EKG ATRIAL RATE: 87 BPM
EKG P AXIS: 31 DEGREES
EKG P-R INTERVAL: 130 MS
EKG Q-T INTERVAL: 388 MS
EKG QRS DURATION: 90 MS
EKG QTC CALCULATION (BAZETT): 466 MS
EKG R AXIS: 24 DEGREES
EKG T AXIS: 87 DEGREES
EKG VENTRICULAR RATE: 87 BPM
URINE CULTURE, ROUTINE: NORMAL

## 2020-11-17 PROCEDURE — 93010 ELECTROCARDIOGRAM REPORT: CPT | Performed by: INTERNAL MEDICINE

## 2020-11-17 NOTE — TELEPHONE ENCOUNTER
Reviewed dye allergy prep with patient. Instructed to take prednisone 1830 andd 2330 today, then 0630 tomorrow. Also benadryl 0630 tomorrow. She states understanding.

## 2020-11-17 NOTE — PROCEDURES
[address]                  [address] [facility]                               PULMONARY FUNCTION    PATIENT NAME: Hilary Reveles                   :        1945  MED REC NO:   [id number]                            ROOM:  ACCOUNT NO:   [de-identified]                           ADMIT DATE: 2020  PROVIDER:     Jose Lawson DO    DATE OF PROCEDURE:  2020    A 81-year-old female, 65 inches, 200 pounds. PREOPERATIVE ASSESSMENT:  Spirometry was performed, reveals a forced  vital capacity of 0.33 liters, 96% of predicted, FEV1 of 2.13 liters,  113% of predicted and FEV1/FVC ratio of 92%. Midflow rates are normal.   The maximum voluntary ventilation of 80 liters per minute, 93% of  predicted. The patient had difficulty performing the diffusion  assessment, thus did not report as inaccuracy was present. IMPRESSION:  Normal spirometry. Clinical correlation needed.         Sha Kapoor DO    D: 2020 16:15:08       T: 2020 16:18:39     [id number]  Job#: [id number]     Doc#: [id number]    CC:

## 2020-11-18 ENCOUNTER — ANESTHESIA (OUTPATIENT)
Dept: OPERATING ROOM | Age: 75
DRG: 267 | End: 2020-11-18
Payer: MEDICARE

## 2020-11-18 ENCOUNTER — HOSPITAL ENCOUNTER (INPATIENT)
Age: 75
LOS: 1 days | Discharge: HOME | DRG: 267 | End: 2020-11-19
Attending: INTERNAL MEDICINE | Admitting: INTERNAL MEDICINE
Payer: MEDICARE

## 2020-11-18 VITALS
SYSTOLIC BLOOD PRESSURE: 196 MMHG | OXYGEN SATURATION: 99 % | RESPIRATION RATE: 16 BRPM | DIASTOLIC BLOOD PRESSURE: 81 MMHG

## 2020-11-18 PROBLEM — I35.0 SEVERE AORTIC STENOSIS: Status: ACTIVE | Noted: 2020-11-18

## 2020-11-18 LAB
ANION GAP SERPL CALCULATED.3IONS-SCNC: 11 MMOL/L (ref 7–16)
BUN BLDV-MCNC: 24 MG/DL (ref 8–23)
CALCIUM SERPL-MCNC: 8.9 MG/DL (ref 8.6–10.2)
CHLORIDE BLD-SCNC: 105 MMOL/L (ref 98–107)
CO2: 21 MMOL/L (ref 22–29)
CREAT SERPL-MCNC: 1.1 MG/DL (ref 0.5–1)
GFR AFRICAN AMERICAN: 59
GFR NON-AFRICAN AMERICAN: 59 ML/MIN/1.73
GLUCOSE BLD-MCNC: 207 MG/DL (ref 74–99)
HCT VFR BLD CALC: 36.4 % (ref 34–48)
HEMOGLOBIN: 11.7 G/DL (ref 11.5–15.5)
MCH RBC QN AUTO: 25.3 PG (ref 26–35)
MCHC RBC AUTO-ENTMCNC: 32.1 % (ref 32–34.5)
MCV RBC AUTO: 78.8 FL (ref 80–99.9)
METER GLUCOSE: 170 MG/DL (ref 74–99)
METER GLUCOSE: 172 MG/DL (ref 74–99)
METER GLUCOSE: 174 MG/DL (ref 74–99)
MRSA CULTURE ONLY: NORMAL
PDW BLD-RTO: 15.6 FL (ref 11.5–15)
PLATELET # BLD: 225 E9/L (ref 130–450)
PMV BLD AUTO: 11.5 FL (ref 7–12)
POTASSIUM SERPL-SCNC: 3.7 MMOL/L (ref 3.5–5)
POTASSIUM SERPL-SCNC: 3.8 MMOL/L (ref 3.5–5)
RBC # BLD: 4.62 E12/L (ref 3.5–5.5)
SODIUM BLD-SCNC: 137 MMOL/L (ref 132–146)
WBC # BLD: 10.1 E9/L (ref 4.5–11.5)

## 2020-11-18 PROCEDURE — 02RF38Z REPLACEMENT OF AORTIC VALVE WITH ZOOPLASTIC TISSUE, PERCUTANEOUS APPROACH: ICD-10-PCS | Performed by: THORACIC SURGERY (CARDIOTHORACIC VASCULAR SURGERY)

## 2020-11-18 PROCEDURE — 6360000002 HC RX W HCPCS

## 2020-11-18 PROCEDURE — C1769 GUIDE WIRE: HCPCS | Performed by: INTERNAL MEDICINE

## 2020-11-18 PROCEDURE — 3600000018 HC SURGERY OHS ADDTL 15MIN: Performed by: INTERNAL MEDICINE

## 2020-11-18 PROCEDURE — 3700000000 HC ANESTHESIA ATTENDED CARE: Performed by: INTERNAL MEDICINE

## 2020-11-18 PROCEDURE — 6370000000 HC RX 637 (ALT 250 FOR IP): Performed by: PHYSICIAN ASSISTANT

## 2020-11-18 PROCEDURE — 3700000001 HC ADD 15 MINUTES (ANESTHESIA): Performed by: INTERNAL MEDICINE

## 2020-11-18 PROCEDURE — 93325 DOPPLER ECHO COLOR FLOW MAPG: CPT

## 2020-11-18 PROCEDURE — 93312 ECHO TRANSESOPHAGEAL: CPT

## 2020-11-18 PROCEDURE — 6370000000 HC RX 637 (ALT 250 FOR IP): Performed by: INTERNAL MEDICINE

## 2020-11-18 PROCEDURE — B3101ZZ FLUOROSCOPY OF THORACIC AORTA USING LOW OSMOLAR CONTRAST: ICD-10-PCS | Performed by: THORACIC SURGERY (CARDIOTHORACIC VASCULAR SURGERY)

## 2020-11-18 PROCEDURE — C9248 INJ, CLEVIDIPINE BUTYRATE: HCPCS | Performed by: NURSE PRACTITIONER

## 2020-11-18 PROCEDURE — 36415 COLL VENOUS BLD VENIPUNCTURE: CPT

## 2020-11-18 PROCEDURE — 85027 COMPLETE CBC AUTOMATED: CPT

## 2020-11-18 PROCEDURE — 2709999900 HC NON-CHARGEABLE SUPPLY: Performed by: INTERNAL MEDICINE

## 2020-11-18 PROCEDURE — 6360000002 HC RX W HCPCS: Performed by: PHYSICIAN ASSISTANT

## 2020-11-18 PROCEDURE — 2580000003 HC RX 258: Performed by: PHYSICIAN ASSISTANT

## 2020-11-18 PROCEDURE — C1769 GUIDE WIRE: HCPCS

## 2020-11-18 PROCEDURE — 2500000003 HC RX 250 WO HCPCS

## 2020-11-18 PROCEDURE — 85347 COAGULATION TIME ACTIVATED: CPT

## 2020-11-18 PROCEDURE — 33362 REPLACE AORTIC VALVE OPEN: CPT | Performed by: INTERNAL MEDICINE

## 2020-11-18 PROCEDURE — 2780000006 HC MISC HEART VALVE: Performed by: INTERNAL MEDICINE

## 2020-11-18 PROCEDURE — 2000000000 HC ICU R&B

## 2020-11-18 PROCEDURE — C1894 INTRO/SHEATH, NON-LASER: HCPCS | Performed by: INTERNAL MEDICINE

## 2020-11-18 PROCEDURE — 6360000002 HC RX W HCPCS: Performed by: NURSE PRACTITIONER

## 2020-11-18 PROCEDURE — 2580000003 HC RX 258

## 2020-11-18 PROCEDURE — 93321 DOPPLER ECHO F-UP/LMTD STD: CPT

## 2020-11-18 PROCEDURE — 2500000003 HC RX 250 WO HCPCS: Performed by: THORACIC SURGERY (CARDIOTHORACIC VASCULAR SURGERY)

## 2020-11-18 PROCEDURE — 82962 GLUCOSE BLOOD TEST: CPT

## 2020-11-18 PROCEDURE — C1725 CATH, TRANSLUMIN NON-LASER: HCPCS | Performed by: INTERNAL MEDICINE

## 2020-11-18 PROCEDURE — C1760 CLOSURE DEV, VASC: HCPCS | Performed by: INTERNAL MEDICINE

## 2020-11-18 PROCEDURE — 33362 REPLACE AORTIC VALVE OPEN: CPT | Performed by: THORACIC SURGERY (CARDIOTHORACIC VASCULAR SURGERY)

## 2020-11-18 PROCEDURE — 80048 BASIC METABOLIC PNL TOTAL CA: CPT

## 2020-11-18 PROCEDURE — 84132 ASSAY OF SERUM POTASSIUM: CPT

## 2020-11-18 PROCEDURE — 3600000008 HC SURGERY OHS BASE: Performed by: INTERNAL MEDICINE

## 2020-11-18 DEVICE — IMPLANTABLE DEVICE: Type: IMPLANTABLE DEVICE | Status: FUNCTIONAL

## 2020-11-18 RX ORDER — PROTAMINE SULFATE 10 MG/ML
INJECTION, SOLUTION INTRAVENOUS PRN
Status: DISCONTINUED | OUTPATIENT
Start: 2020-11-18 | End: 2020-11-18 | Stop reason: SDUPTHER

## 2020-11-18 RX ORDER — MIDAZOLAM HYDROCHLORIDE 1 MG/ML
INJECTION INTRAMUSCULAR; INTRAVENOUS PRN
Status: DISCONTINUED | OUTPATIENT
Start: 2020-11-18 | End: 2020-11-18 | Stop reason: SDUPTHER

## 2020-11-18 RX ORDER — PHENYLEPHRINE HYDROCHLORIDE 10 MG/ML
INJECTION INTRAVENOUS PRN
Status: DISCONTINUED | OUTPATIENT
Start: 2020-11-18 | End: 2020-11-18 | Stop reason: SDUPTHER

## 2020-11-18 RX ORDER — ACETAMINOPHEN 500 MG
500 TABLET ORAL EVERY 6 HOURS PRN
Status: DISCONTINUED | OUTPATIENT
Start: 2020-11-18 | End: 2020-11-19 | Stop reason: HOSPADM

## 2020-11-18 RX ORDER — CHLORHEXIDINE GLUCONATE 4 G/100ML
SOLUTION TOPICAL ONCE
Status: COMPLETED | OUTPATIENT
Start: 2020-11-18 | End: 2020-11-18

## 2020-11-18 RX ORDER — LISINOPRIL 20 MG/1
20 TABLET ORAL DAILY
Status: DISCONTINUED | OUTPATIENT
Start: 2020-11-18 | End: 2020-11-18 | Stop reason: SDUPTHER

## 2020-11-18 RX ORDER — DEXTROSE MONOHYDRATE 25 G/50ML
12.5 INJECTION, SOLUTION INTRAVENOUS PRN
Status: DISCONTINUED | OUTPATIENT
Start: 2020-11-18 | End: 2020-11-19 | Stop reason: HOSPADM

## 2020-11-18 RX ORDER — AMLODIPINE BESYLATE 5 MG/1
5 TABLET ORAL DAILY
Status: DISCONTINUED | OUTPATIENT
Start: 2020-11-18 | End: 2020-11-19 | Stop reason: HOSPADM

## 2020-11-18 RX ORDER — NEOSTIGMINE METHYLSULFATE 1 MG/ML
INJECTION, SOLUTION INTRAVENOUS PRN
Status: DISCONTINUED | OUTPATIENT
Start: 2020-11-18 | End: 2020-11-18 | Stop reason: SDUPTHER

## 2020-11-18 RX ORDER — CLOPIDOGREL BISULFATE 75 MG/1
75 TABLET ORAL DAILY
Status: DISCONTINUED | OUTPATIENT
Start: 2020-11-18 | End: 2020-11-19 | Stop reason: HOSPADM

## 2020-11-18 RX ORDER — PROTAMINE SULFATE 10 MG/ML
50 INJECTION, SOLUTION INTRAVENOUS
Status: ACTIVE | OUTPATIENT
Start: 2020-11-18 | End: 2020-11-18

## 2020-11-18 RX ORDER — ROCURONIUM BROMIDE 10 MG/ML
INJECTION, SOLUTION INTRAVENOUS PRN
Status: DISCONTINUED | OUTPATIENT
Start: 2020-11-18 | End: 2020-11-18 | Stop reason: SDUPTHER

## 2020-11-18 RX ORDER — SODIUM CHLORIDE 9 MG/ML
INJECTION, SOLUTION INTRAVENOUS CONTINUOUS PRN
Status: DISCONTINUED | OUTPATIENT
Start: 2020-11-18 | End: 2020-11-18 | Stop reason: SDUPTHER

## 2020-11-18 RX ORDER — DEXAMETHASONE SODIUM PHOSPHATE 10 MG/ML
INJECTION, SOLUTION INTRAMUSCULAR; INTRAVENOUS PRN
Status: DISCONTINUED | OUTPATIENT
Start: 2020-11-18 | End: 2020-11-18 | Stop reason: SDUPTHER

## 2020-11-18 RX ORDER — GLYCOPYRROLATE 1 MG/5 ML
SYRINGE (ML) INTRAVENOUS PRN
Status: DISCONTINUED | OUTPATIENT
Start: 2020-11-18 | End: 2020-11-18 | Stop reason: SDUPTHER

## 2020-11-18 RX ORDER — LISINOPRIL 20 MG/1
20 TABLET ORAL 2 TIMES DAILY
Status: DISCONTINUED | OUTPATIENT
Start: 2020-11-18 | End: 2020-11-19 | Stop reason: HOSPADM

## 2020-11-18 RX ORDER — SODIUM CHLORIDE 9 MG/ML
INJECTION, SOLUTION INTRAVENOUS CONTINUOUS
Status: DISCONTINUED | OUTPATIENT
Start: 2020-11-18 | End: 2020-11-18

## 2020-11-18 RX ORDER — SODIUM CHLORIDE 0.9 % (FLUSH) 0.9 %
10 SYRINGE (ML) INJECTION EVERY 12 HOURS SCHEDULED
Status: DISCONTINUED | OUTPATIENT
Start: 2020-11-18 | End: 2020-11-19 | Stop reason: HOSPADM

## 2020-11-18 RX ORDER — ATORVASTATIN CALCIUM 20 MG/1
20 TABLET, FILM COATED ORAL DAILY
Status: DISCONTINUED | OUTPATIENT
Start: 2020-11-18 | End: 2020-11-19 | Stop reason: HOSPADM

## 2020-11-18 RX ORDER — OXYCODONE HYDROCHLORIDE AND ACETAMINOPHEN 5; 325 MG/1; MG/1
1 TABLET ORAL EVERY 4 HOURS PRN
Status: DISCONTINUED | OUTPATIENT
Start: 2020-11-18 | End: 2020-11-19 | Stop reason: HOSPADM

## 2020-11-18 RX ORDER — ASPIRIN 81 MG/1
81 TABLET ORAL DAILY
Status: DISCONTINUED | OUTPATIENT
Start: 2020-11-18 | End: 2020-11-18

## 2020-11-18 RX ORDER — PROMETHAZINE HYDROCHLORIDE 25 MG/ML
6.25 INJECTION, SOLUTION INTRAMUSCULAR; INTRAVENOUS
Status: DISCONTINUED | OUTPATIENT
Start: 2020-11-18 | End: 2020-11-18

## 2020-11-18 RX ORDER — SODIUM CHLORIDE 0.9 % (FLUSH) 0.9 %
10 SYRINGE (ML) INJECTION PRN
Status: DISCONTINUED | OUTPATIENT
Start: 2020-11-18 | End: 2020-11-18

## 2020-11-18 RX ORDER — SODIUM CHLORIDE 0.9 % (FLUSH) 0.9 %
10 SYRINGE (ML) INJECTION PRN
Status: DISCONTINUED | OUTPATIENT
Start: 2020-11-18 | End: 2020-11-19 | Stop reason: HOSPADM

## 2020-11-18 RX ORDER — SPIRONOLACTONE 25 MG/1
25 TABLET ORAL DAILY
Status: DISCONTINUED | OUTPATIENT
Start: 2020-11-18 | End: 2020-11-19 | Stop reason: HOSPADM

## 2020-11-18 RX ORDER — DEXTROSE MONOHYDRATE 50 MG/ML
100 INJECTION, SOLUTION INTRAVENOUS PRN
Status: DISCONTINUED | OUTPATIENT
Start: 2020-11-18 | End: 2020-11-19 | Stop reason: HOSPADM

## 2020-11-18 RX ORDER — OLOPATADINE HYDROCHLORIDE 1 MG/ML
1 SOLUTION/ DROPS OPHTHALMIC DAILY PRN
Status: DISCONTINUED | OUTPATIENT
Start: 2020-11-18 | End: 2020-11-19 | Stop reason: HOSPADM

## 2020-11-18 RX ORDER — LIDOCAINE HYDROCHLORIDE 20 MG/ML
INJECTION, SOLUTION INFILTRATION; PERINEURAL PRN
Status: DISCONTINUED | OUTPATIENT
Start: 2020-11-18 | End: 2020-11-18 | Stop reason: SDUPTHER

## 2020-11-18 RX ORDER — ETOMIDATE 2 MG/ML
INJECTION INTRAVENOUS PRN
Status: DISCONTINUED | OUTPATIENT
Start: 2020-11-18 | End: 2020-11-18 | Stop reason: SDUPTHER

## 2020-11-18 RX ORDER — NICOTINE POLACRILEX 4 MG
15 LOZENGE BUCCAL PRN
Status: DISCONTINUED | OUTPATIENT
Start: 2020-11-18 | End: 2020-11-19 | Stop reason: HOSPADM

## 2020-11-18 RX ORDER — FENTANYL CITRATE 50 UG/ML
INJECTION, SOLUTION INTRAMUSCULAR; INTRAVENOUS PRN
Status: DISCONTINUED | OUTPATIENT
Start: 2020-11-18 | End: 2020-11-18 | Stop reason: SDUPTHER

## 2020-11-18 RX ORDER — ONDANSETRON 2 MG/ML
4 INJECTION INTRAMUSCULAR; INTRAVENOUS
Status: DISCONTINUED | OUTPATIENT
Start: 2020-11-18 | End: 2020-11-18

## 2020-11-18 RX ORDER — ONDANSETRON 2 MG/ML
INJECTION INTRAMUSCULAR; INTRAVENOUS PRN
Status: DISCONTINUED | OUTPATIENT
Start: 2020-11-18 | End: 2020-11-18 | Stop reason: SDUPTHER

## 2020-11-18 RX ORDER — METOPROLOL SUCCINATE 50 MG/1
25 TABLET, EXTENDED RELEASE ORAL NIGHTLY
Status: DISCONTINUED | OUTPATIENT
Start: 2020-11-18 | End: 2020-11-19

## 2020-11-18 RX ORDER — HYDRALAZINE HYDROCHLORIDE 20 MG/ML
10 INJECTION INTRAMUSCULAR; INTRAVENOUS EVERY 4 HOURS PRN
Status: DISCONTINUED | OUTPATIENT
Start: 2020-11-18 | End: 2020-11-19 | Stop reason: HOSPADM

## 2020-11-18 RX ORDER — HEPARIN SODIUM 1000 [USP'U]/ML
INJECTION, SOLUTION INTRAVENOUS; SUBCUTANEOUS PRN
Status: DISCONTINUED | OUTPATIENT
Start: 2020-11-18 | End: 2020-11-18 | Stop reason: SDUPTHER

## 2020-11-18 RX ORDER — SODIUM CHLORIDE 0.9 % (FLUSH) 0.9 %
10 SYRINGE (ML) INJECTION EVERY 12 HOURS SCHEDULED
Status: DISCONTINUED | OUTPATIENT
Start: 2020-11-18 | End: 2020-11-18

## 2020-11-18 RX ORDER — SODIUM CHLORIDE, SODIUM LACTATE, POTASSIUM CHLORIDE, CALCIUM CHLORIDE 600; 310; 30; 20 MG/100ML; MG/100ML; MG/100ML; MG/100ML
INJECTION, SOLUTION INTRAVENOUS CONTINUOUS PRN
Status: DISCONTINUED | OUTPATIENT
Start: 2020-11-18 | End: 2020-11-18 | Stop reason: SDUPTHER

## 2020-11-18 RX ORDER — LABETALOL HYDROCHLORIDE 5 MG/ML
INJECTION, SOLUTION INTRAVENOUS PRN
Status: DISCONTINUED | OUTPATIENT
Start: 2020-11-18 | End: 2020-11-18 | Stop reason: SDUPTHER

## 2020-11-18 RX ORDER — BUPIVACAINE HYDROCHLORIDE AND EPINEPHRINE 5; 5 MG/ML; UG/ML
INJECTION, SOLUTION EPIDURAL; INTRACAUDAL; PERINEURAL PRN
Status: DISCONTINUED | OUTPATIENT
Start: 2020-11-18 | End: 2020-11-18 | Stop reason: ALTCHOICE

## 2020-11-18 RX ORDER — CHLORHEXIDINE GLUCONATE 0.12 MG/ML
15 RINSE ORAL ONCE
Status: COMPLETED | OUTPATIENT
Start: 2020-11-18 | End: 2020-11-18

## 2020-11-18 RX ORDER — ONDANSETRON 2 MG/ML
4 INJECTION INTRAMUSCULAR; INTRAVENOUS EVERY 8 HOURS PRN
Status: DISCONTINUED | OUTPATIENT
Start: 2020-11-18 | End: 2020-11-19 | Stop reason: HOSPADM

## 2020-11-18 RX ADMIN — CLEVIPIDINE 28 MG/HR: 0.5 EMULSION INTRAVENOUS at 19:42

## 2020-11-18 RX ADMIN — SODIUM CHLORIDE, PRESERVATIVE FREE 10 ML: 5 INJECTION INTRAVENOUS at 19:51

## 2020-11-18 RX ADMIN — MUPIROCIN: 20 OINTMENT TOPICAL at 05:53

## 2020-11-18 RX ADMIN — INSULIN LISPRO 2 UNITS: 100 INJECTION, SOLUTION INTRAVENOUS; SUBCUTANEOUS at 20:44

## 2020-11-18 RX ADMIN — CLEVIPIDINE 30 MG/HR: 0.5 EMULSION INTRAVENOUS at 21:27

## 2020-11-18 RX ADMIN — Medication 0.6 MG: at 08:48

## 2020-11-18 RX ADMIN — CLEVIPIDINE 22 MG/HR: 0.5 EMULSION INTRAVENOUS at 17:31

## 2020-11-18 RX ADMIN — VANCOMYCIN HYDROCHLORIDE 1.5 G: 10 INJECTION, POWDER, LYOPHILIZED, FOR SOLUTION INTRAVENOUS at 06:31

## 2020-11-18 RX ADMIN — VANCOMYCIN HYDROCHLORIDE 1.5 G: 10 INJECTION, POWDER, LYOPHILIZED, FOR SOLUTION INTRAVENOUS at 20:07

## 2020-11-18 RX ADMIN — CLEVIPIDINE 16 MG/HR: 0.5 EMULSION INTRAVENOUS at 11:48

## 2020-11-18 RX ADMIN — ATORVASTATIN CALCIUM 20 MG: 20 TABLET, FILM COATED ORAL at 16:35

## 2020-11-18 RX ADMIN — FENTANYL CITRATE 100 MCG: 50 INJECTION, SOLUTION INTRAMUSCULAR; INTRAVENOUS at 07:33

## 2020-11-18 RX ADMIN — MIDAZOLAM 1 MG: 1 INJECTION INTRAMUSCULAR; INTRAVENOUS at 07:20

## 2020-11-18 RX ADMIN — SPIRONOLACTONE 25 MG: 25 TABLET ORAL at 14:57

## 2020-11-18 RX ADMIN — INSULIN LISPRO 2 UNITS: 100 INJECTION, SOLUTION INTRAVENOUS; SUBCUTANEOUS at 16:36

## 2020-11-18 RX ADMIN — CLEVIPIDINE 22 MG/HR: 0.5 EMULSION INTRAVENOUS at 14:49

## 2020-11-18 RX ADMIN — HEPARIN SODIUM 10000 UNITS: 1000 INJECTION INTRAVENOUS; SUBCUTANEOUS at 08:07

## 2020-11-18 RX ADMIN — CLEVIPIDINE 30 MG/HR: 0.5 EMULSION INTRAVENOUS at 23:04

## 2020-11-18 RX ADMIN — LISINOPRIL 20 MG: 20 TABLET ORAL at 14:57

## 2020-11-18 RX ADMIN — ONDANSETRON HYDROCHLORIDE 4 MG: 2 INJECTION, SOLUTION INTRAMUSCULAR; INTRAVENOUS at 08:30

## 2020-11-18 RX ADMIN — LABETALOL HYDROCHLORIDE 10 MG: 5 INJECTION INTRAVENOUS at 08:51

## 2020-11-18 RX ADMIN — METOPROLOL SUCCINATE 25 MG: 50 TABLET, EXTENDED RELEASE ORAL at 19:50

## 2020-11-18 RX ADMIN — PHENYLEPHRINE HYDROCHLORIDE 100 MCG: 10 INJECTION INTRAVENOUS at 07:36

## 2020-11-18 RX ADMIN — AMLODIPINE BESYLATE 5 MG: 5 TABLET ORAL at 21:53

## 2020-11-18 RX ADMIN — SODIUM CHLORIDE, POTASSIUM CHLORIDE, SODIUM LACTATE AND CALCIUM CHLORIDE: 600; 310; 30; 20 INJECTION, SOLUTION INTRAVENOUS at 07:42

## 2020-11-18 RX ADMIN — ROCURONIUM BROMIDE 50 MG: 10 INJECTION, SOLUTION INTRAVENOUS at 07:33

## 2020-11-18 RX ADMIN — CHLORHEXIDINE GLUCONATE: 213 SOLUTION TOPICAL at 05:53

## 2020-11-18 RX ADMIN — SODIUM CHLORIDE: 9 INJECTION, SOLUTION INTRAVENOUS at 05:52

## 2020-11-18 RX ADMIN — SODIUM CHLORIDE: 9 INJECTION, SOLUTION INTRAVENOUS at 07:24

## 2020-11-18 RX ADMIN — CLEVIPIDINE 22 MG/HR: 0.5 EMULSION INTRAVENOUS at 18:22

## 2020-11-18 RX ADMIN — LISINOPRIL 20 MG: 20 TABLET ORAL at 19:50

## 2020-11-18 RX ADMIN — PROTAMINE SULFATE 50 MG: 10 INJECTION, SOLUTION INTRAVENOUS at 08:35

## 2020-11-18 RX ADMIN — Medication 3 MG: at 08:48

## 2020-11-18 RX ADMIN — INSULIN LISPRO 2 UNITS: 100 INJECTION, SOLUTION INTRAVENOUS; SUBCUTANEOUS at 10:05

## 2020-11-18 RX ADMIN — ETOMIDATE 20 MG: 2 INJECTION, SOLUTION INTRAVENOUS at 07:33

## 2020-11-18 RX ADMIN — CLOPIDOGREL 75 MG: 75 TABLET, FILM COATED ORAL at 16:36

## 2020-11-18 RX ADMIN — CLEVIPIDINE 16 MG/HR: 0.5 EMULSION INTRAVENOUS at 10:07

## 2020-11-18 RX ADMIN — LIDOCAINE HYDROCHLORIDE 60 MG: 20 INJECTION, SOLUTION INFILTRATION; PERINEURAL at 07:33

## 2020-11-18 RX ADMIN — CLEVIPIDINE 28 MG/HR: 0.5 EMULSION INTRAVENOUS at 20:17

## 2020-11-18 RX ADMIN — MIDAZOLAM 1 MG: 1 INJECTION INTRAMUSCULAR; INTRAVENOUS at 07:24

## 2020-11-18 RX ADMIN — DEXAMETHASONE SODIUM PHOSPHATE 10 MG: 10 INJECTION, SOLUTION INTRAMUSCULAR; INTRAVENOUS at 07:47

## 2020-11-18 RX ADMIN — CHLORHEXIDINE GLUCONATE 15 ML: 1.2 RINSE ORAL at 05:53

## 2020-11-18 RX ADMIN — CLEVIPIDINE 22 MG/HR: 0.5 EMULSION INTRAVENOUS at 16:13

## 2020-11-18 RX ADMIN — PHENYLEPHRINE HYDROCHLORIDE 100 MCG: 10 INJECTION INTRAVENOUS at 07:33

## 2020-11-18 RX ADMIN — CLEVIPIDINE 18 MG/HR: 0.5 EMULSION INTRAVENOUS at 13:19

## 2020-11-18 RX ADMIN — CLEVIPIDINE 30 MG/HR: 0.5 EMULSION INTRAVENOUS at 22:15

## 2020-11-18 ASSESSMENT — PAIN SCALES - GENERAL
PAINLEVEL_OUTOF10: 0

## 2020-11-18 ASSESSMENT — PULMONARY FUNCTION TESTS
PIF_VALUE: 20
PIF_VALUE: 22
PIF_VALUE: 20
PIF_VALUE: 23
PIF_VALUE: 23
PIF_VALUE: 22
PIF_VALUE: 21
PIF_VALUE: 21
PIF_VALUE: 25
PIF_VALUE: 22
PIF_VALUE: 21
PIF_VALUE: 1
PIF_VALUE: 3
PIF_VALUE: 24
PIF_VALUE: 22
PIF_VALUE: 24
PIF_VALUE: 22
PIF_VALUE: 21
PIF_VALUE: 21
PIF_VALUE: 2
PIF_VALUE: 22
PIF_VALUE: 23
PIF_VALUE: 22
PIF_VALUE: 21
PIF_VALUE: 1
PIF_VALUE: 21
PIF_VALUE: 23
PIF_VALUE: 22
PIF_VALUE: 23
PIF_VALUE: 21
PIF_VALUE: 21
PIF_VALUE: 22
PIF_VALUE: 23
PIF_VALUE: 1
PIF_VALUE: 23
PIF_VALUE: 20
PIF_VALUE: 24
PIF_VALUE: 22
PIF_VALUE: 1
PIF_VALUE: 1
PIF_VALUE: 3
PIF_VALUE: 24
PIF_VALUE: 23
PIF_VALUE: 21
PIF_VALUE: 21
PIF_VALUE: 23
PIF_VALUE: 22
PIF_VALUE: 2
PIF_VALUE: 23
PIF_VALUE: 23
PIF_VALUE: 22
PIF_VALUE: 21
PIF_VALUE: 37
PIF_VALUE: 23
PIF_VALUE: 22
PIF_VALUE: 21
PIF_VALUE: 22
PIF_VALUE: 23
PIF_VALUE: 22
PIF_VALUE: 23
PIF_VALUE: 21
PIF_VALUE: 23
PIF_VALUE: 1
PIF_VALUE: 24
PIF_VALUE: 23
PIF_VALUE: 33
PIF_VALUE: 22
PIF_VALUE: 21
PIF_VALUE: 22
PIF_VALUE: 23
PIF_VALUE: 24
PIF_VALUE: 0
PIF_VALUE: 3
PIF_VALUE: 23
PIF_VALUE: 2
PIF_VALUE: 1
PIF_VALUE: 21
PIF_VALUE: 24
PIF_VALUE: 1
PIF_VALUE: 22
PIF_VALUE: 23
PIF_VALUE: 2
PIF_VALUE: 23
PIF_VALUE: 2
PIF_VALUE: 21
PIF_VALUE: 0
PIF_VALUE: 24
PIF_VALUE: 23
PIF_VALUE: 22

## 2020-11-18 ASSESSMENT — PAIN - FUNCTIONAL ASSESSMENT: PAIN_FUNCTIONAL_ASSESSMENT: 0-10

## 2020-11-18 NOTE — H&P
Patient name: Geri Bee    Reason for admission: TAVR    Admitting Physician: Cindy Christian MD    Primary Care Physician: Re Ibanez MD    Date of service: 11/18/2020    Chief Complaint: symptomatic, mixed aortic valve disease    HPI: Ms. Pura Thorne is a 70year old,  [place] female with history of HTN, HLD, CKD, contrast dye allergy, and valvular heart disease. She was referred to the valve clinic after CHAI revealed moderate aortic stenosis and severe aortic regurgitation. She is symptomatic with decreased exertional tolerance over the past 1 or 2 years. As an example, she says that she cannot cut her grass anymore which she was able to do a year ago. She also cannot walk around as far and is limited to maybe 1 block currently due to dyspnea. She has no dyspnea at rest, orthopnea, PND or lower extremity edema. She has no palpitations. She has orthostatic lightheadedness. She has no chest discomfort. She has no history of stroke or diabetes. She underwent full preoperative work up. Cardiac catheterization revealed tortuous vessels, but no significant disease. Remaining tests were negative and STS calculates to 1.6%. Allergies: Allergies   Allergen Reactions    Dye [Iodides] Hives    Pcn [Penicillins] Rash    Aspirin      ?        Home medications:    Current Facility-Administered Medications   Medication Dose Route Frequency Provider Last Rate Last Dose    0.9 % sodium chloride infusion   Intravenous Continuous ELSA Nguyen 20 mL/hr at 11/18/20 0552      sodium chloride flush 0.9 % injection 10 mL  10 mL Intravenous 2 times per day ELSA Nguyen        sodium chloride flush 0.9 % injection 10 mL  10 mL Intravenous PRN ELSA Nguyen        vancomycin 1.5 g in dextrose 5% 300 mL IVPB  1,500 mg Intravenous On Call to [address] [place] [address] 150 mL/hr at 11/18/20 0631 1.5 g at 11/18/20 0631       Past Medical History:  Past Medical History:   Diagnosis Date  Aortic insufficiency 06/2020    Aortic stenosis 06/2020    Arthritis     Heart murmur     Hyperlipidemia     Hypertension     Night sweats        Past Surgical History:  Past Surgical History:   Procedure Laterality Date    CATARACT REMOVAL WITH IMPLANT  2010    Bilateral     CHOLECYSTECTOMY      COLONOSCOPY      HYSTERECTOMY      PARTIAL    TRANSESOPHAGEAL ECHOCARDIOGRAM  08/20/2020    DR Ocampo       Social History:  Social History     Socioeconomic History    Marital status: Single     Spouse name: Not on file    Number of children: Not on file    Years of education: Not on file    Highest education level: Not on file   Occupational History    Not on file   Social Needs    Financial resource strain: Not on file    Food insecurity     Worry: Not on file     Inability: Not on file    Transportation needs     Medical: Not on file     Non-medical: Not on file   Tobacco Use    Smoking status: Never Smoker    Smokeless tobacco: Never Used   Substance and Sexual Activity    Alcohol use: No    Drug use: No    Sexual activity: Yes   Lifestyle    Physical activity     Days per week: Not on file     Minutes per session: Not on file    Stress: Not on file   Relationships    Social connections     Talks on phone: Not on file     Gets together: Not on file     Attends Orthodoxy service: Not on file     Active member of club or organization: Not on file     Attends meetings of clubs or organizations: Not on file     Relationship status: Not on file    Intimate partner violence     Fear of current or ex partner: Not on file     Emotionally abused: Not on file     Physically abused: Not on file     Forced sexual activity: Not on file   Other Topics Concern    Not on file   Social History Narrative    Not on file       Family History:  Family History   Family history unknown: Yes       Review of Systems:  Constitutional: Denies fevers, chills, or weight loss.   HEENT: Denies visual changes or hearing loss. Heart: As per HPI. Lungs: Denies shortness of breath, cough, or wheezing. Gastrointestinal: Denies nausea, vomiting, constipation, or diarrhea. Genitourinary: dysuria or hematuria. Psychiatric: Patient denies anxiety or depression. Neurologic: Patient denies weakness of the extremities, dizziness, or headaches. All other ROS checked and found to be negative. Objective:  Vitals BP (!) 176/81   Pulse 108   Temp 97.5 °F (36.4 °C)   Resp 20   Ht 5' 5\" (1.651 m)   Wt 200 lb (90.7 kg)   SpO2 96%   BMI 33.28 kg/m²   General Appearance: Pleasant 70y.o. year old female who appears stated age. Communicates well, no acute distress. HEENT: Head is normocephalic, atraumatic. EOMs intact, PERRL. Trachea midline. Lungs: Normal respiratory rate and normal effort. She is not in respiratory distress. Breath sounds clear to auscultation. No wheezes. Heart: Normal rate. Regular rhythm. S1 normal and S2 normal. Positive for murmur. Chest: Symmetric chest wall expansion. Extremities: Normal range of motion. Neurological: Patient is alert and oriented to person, place and time. Patient has normal reflexes. Skin: Warm and dry. Abdomen: Abdomen is soft and non-distended. Bowel sounds are normal. There is no abdominal tenderness tenderness. There is no guarding. There is no mass. Pulses: Distal pulses are intact. Skin: Warm and dry without lesions. Assessment:   1. Symptomatic mixed aortic valve disease (mod AS, severe AI)  2. HTN  3. Diastolic CHF - NYHA class III  4. CKD  5. Dye allergy      Plan:   1. TAVR  2.  Premedicated for contrast allergy        Electronically signed by Ashely Cruz PA-C on 11/18/2020 at 6:42 AM

## 2020-11-18 NOTE — OP NOTE
TRANSCATHETER AORTIC VALVE REPLACEMENT  Operative Note      Date of procedure:  11/18/20      Interventional Cardiologist: Lion Stanley MD  Cardiothoracic Surgeon: Mohini Rinaldi MD    Pre-operative diagnosis: Severe symptomatic aortic stenosis. Post-operative diagnosis: Successful transcatheter aortic valve replacement (TAVR) using a 26-mm YAQUELIN 3 Ultra valve. detention (Major co morbidities and conditions):   chronic kidney disease and Chronic diastolic heart failure  HTN    STS-PROM estimated risk for 30-day mortality for AVR:  1.6%    TAVR access: right common femoral artery surgical cutdown approach  Pigtail access: left common femoral artery  Appropriate arterial and venous lines were additionally placed by anesthesia as needed. TAVR access closure: primary surgical repair  Pigtail access closure: Angioseal    Procedure:   Arterial access in the TAVR and Pigtail access sites as above  Limited peripheral angiography   Temporary transvenous pacer insertion via left femoral vein   Aortic root angiography  Transfemoral TAVR using a 26-mm YAQUELIN 3 Ultra valve      Anesthesia: general anesthesia with endotracheal intubation    Indication for procedure:   Severe symptomatic aortic stenosis/insufficiency      Description of the Procedure: Following informed consent, the patient was bought to the hybrid OR and placed under anesthesia as above. Transesophageal echo was used to aid in guidance of the procedure. Surgical cutdown  was used to access the right CFA and a placeholder sheath was placed; this access will be used to deliver the trascatheter heart valve eventually. Using ultrasound guidance, an 18G needle was used to access the LCFA and appropriate site was confirmed using angiography and a 5F sheath was placed; this access will be used for the Pigtail catheter subsequently. . The left  femoral vein was accessed and a locking 6F sheath was placed for the temporary pacer. Georgina Isidro      Unfractionated heparin was administered at 100 units/kg to achieve ACT>300s with additional heparin administered as needed    A balloon-tipped temporary transvenous pacemaker was inserted through the femoral venous sheath and into the RV apex. Pacing threshold were obtained and the pacemaker was placed in standby mode. A 5F marker pigtail catheter was then advanced into the aorta and placed at the level of the aortic cusps. A baseline aortic root angiogram was performed to confirm a co-planar angle for valve implant. A 5F multipurpose catheter was advanced into the descending aorta over a J wire and used to place a Lunderquist wire in the descending aorta. A 14F Snell E-Sheath was placed over the Lunderquist wire and sutured in place; the wire was subsequently removed. Next, a 5F AL1 catheter was used to guide a straight-tipped 0.035\" wire to cross the aortic valve. The catheter was advanced into the left ventricle and then exchanged for a second Pigtail catheter. A 0.035\" X 260cm Amplatz Extra Stiff wire was then placed in the LV and the Pigtail catheter removed. Balloon aortic valvuloplasty was not performed prior to THV placement. A Commander delivery system, preloaded with the transcatheter valve was advanced into the descending aorta where valve alignment with the balloon was performed. The THV then was advanced across the aortic valve and aligned with the annular plane in the pre-determined co-planar angle. With rapid pacing at 180 beats per minute, the valve was deployed in a standard fashion using nominal plus 2 cc volume in the balloon. The implant depth was estimated to be 85/15 aortic/ventricular. Post-dilation was not performed. The delivery system and the Amplatz wire were removed. Transesophageal echo demonstrated a well-seated prosthesis with a mean transvalvular gradient of 5 and trace paravalvular leak.  There was no pericardial effusion and the LV systolic function was normal.  An aortic root angiogram revealed trivial AR. IV protamine was administered to reverse anticoagulation. The temporary pacer was removed. Hemostasis was achieved at the arterial sites as above and at the venous site using manual pressure. Complications: None  Blood loss: 50 mL  Contrast use: 64 mL  Air Kerma: 240 mGy  Dose-area product: 3743.7 mcGy. m2  Fluoroscopy time: 7.1 minutes  Specimens: none    Plan:   1. CVIC observation   2. EKG in the morning  Limited TTE in the morning  CBC and BMP in the morning  3. Antithrombotic regimen: Aspirin monotherapy    The interventional cardiologist and cardiothoracic surgeon were scrubbed in for the entirety of the procedure and contributed equally.       Artemio Velazquez MD, [facility]   Interventional Cardiology/Structural Heart Disease

## 2020-11-18 NOTE — OP NOTE
TRANSCATHETER AORTIC VALVE REPLACEMENT  Operative Note        Date of procedure:  11/18/20       Interventional Cardiologist: Eva Tirado MD  Cardiothoracic Surgeon: Shabbir Phillips MD     Pre-operative diagnosis: Severe symptomatic aortic stenosis. Post-operative diagnosis: Successful transcatheter aortic valve replacement (TAVR) using a 26-mm YAQUELIN 3 Ultra valve.      [facility] (Major co morbidities and conditions):   chronic kidney disease and Chronic diastolic heart failure  HTN     STS-PROM estimated risk for 30-day mortality for AVR:  1.6%     TAVR access: right common femoral artery surgical cutdown approach  Pigtail access: left common femoral artery  Appropriate arterial and venous lines were additionally placed by anesthesia as needed.     TAVR access closure: primary surgical repair  Pigtail access closure: Angioseal     Procedure:   Arterial access in the TAVR and Pigtail access sites as above  Limited peripheral angiography   Temporary transvenous pacer insertion via left femoral vein   Aortic root angiography  Transfemoral TAVR using a 26-mm YAQUELIN 3 Ultra valve        Anesthesia: general anesthesia with endotracheal intubation     Indication for procedure:   Severe symptomatic aortic stenosis        Description of the Procedure: Following informed consent, the patient was bought to the hybrid OR and placed under anesthesia as above. Transesophageal echo was used to aid in guidance of the procedure.      Surgical cutdown  was used to access the right CFA and a placeholder sheath was placed; this access will be used to deliver the trascatheter heart valve eventually. Using ultrasound guidance, an 18G needle was used to access the LCFA and appropriate site was confirmed using angiography and a 5F sheath was placed; this access will be used for the Pigtail catheter subsequently. . The left  femoral vein was accessed and a locking 6F sheath was placed for the temporary pacer. .      Unfractionated heparin was administered at 100 units/kg to achieve ACT>300s with additional heparin administered as needed     A balloon-tipped temporary transvenous pacemaker was inserted through the femoral venous sheath and into the RV apex. Pacing threshold were obtained and the pacemaker was placed in standby mode. A 5F marker pigtail catheter was then advanced into the aorta and placed at the level of the aortic cusps. A baseline aortic root angiogram was performed to confirm a co-planar angle for valve implant.      A 5F multipurpose catheter was advanced into the descending aorta over a J wire and used to place a Lunderquist wire in the descending aorta. A 14F Snell E-Sheath was placed over the Lunderquist wire and sutured in place; the wire was subsequently removed.     Next, a 5F AL1 catheter was used to guide a straight-tipped 0.035\" wire to cross the aortic valve. The catheter was advanced into the left ventricle and then exchanged for a second Pigtail catheter. A 0.035\" X 260cm Amplatz Extra Stiff wire was then placed in the LV and the Pigtail catheter removed.       Balloon aortic valvuloplasty was not performed prior to THV placement.      A Commander delivery system, preloaded with the transcatheter valve was advanced into the descending aorta where valve alignment with the balloon was performed. The THV then was advanced across the aortic valve and aligned with the annular plane in the pre-determined co-planar angle. With rapid pacing at 180 beats per minute, the valve was deployed in a standard fashion using nominal plus 2 cc volume in the balloon. The implant depth was estimated to be 85/15 aortic/ventricular.     Post-dilation was not performed. The delivery system and the Amplatz wire were removed.      Transesophageal echo demonstrated a well-seated prosthesis with a mean transvalvular gradient of 5 and trace paravalvular leak.  There was no pericardial effusion and the LV systolic function was normal.  An aortic root angiogram revealed trivial AR.     IV protamine was administered to reverse anticoagulation. The temporary pacer was removed. Hemostasis was achieved at the arterial sites as above and at the venous site using manual pressure.      Complications: None  Blood loss: 50 mL  Contrast use: 64 mL  Air Kerma: 240 mGy  Dose-area product: 3743.7 mcGy. m2  Fluoroscopy time: 7.1 minutes  Specimens: none     Plan:   1. CVIC observation   2. EKG in the morning  Limited TTE in the morning  CBC and BMP in the morning  3. Antithrombotic regimen: Aspirin monotherapy     The interventional cardiologist and cardiothoracic surgeon were scrubbed in for the entirety of the procedure and contributed equally.

## 2020-11-18 NOTE — PROGRESS NOTES
Admitted to Same Day Surgery Unit. Preop instructions given to patient & family. Covid Test done:11/13/2020    Results:Negative    Self-quarantine guidelines followed since tested? Yes    Any unusual S/S or concerns expressed or observed? No

## 2020-11-18 NOTE — FLOWSHEET NOTE
Admitted to PACU from surgery with CRNA. Drowsy,follows commands. Vss. Oxygen per simple mask. Dressings dry and intact,distal pulses per doppler.

## 2020-11-18 NOTE — ANESTHESIA PRE PROCEDURE
Department of Anesthesiology  Preprocedure Note       Name:  Gina Almeida   Age:  70 y.o.  :  1945                                          MRN:  [id number]         Date:  2020      Surgeon: Renetta Manuel):  MD Leander Hernandez MD    Procedure: Procedure(s):  TRANSCATHETER AORTIC VALVE REPLACEMENT FEMORAL APPROACH  TRANSESOPHAGEAL ECHOCARDIOGRAM    Medications prior to admission:   Prior to Admission medications    Medication Sig Start Date End Date Taking? Authorizing Provider   diphenhydrAMINE (BENADRYL ALLERGY) 25 MG capsule Take 25 mg by mouth once Per instructions of ELSA Richards   Yes Historical Provider, MD   PREDNISONE PO Take by mouth once Per instructions from ELSA Richards. Yes Historical Provider, MD   vitamin D (ERGOCALCIFEROL) 1.25 MG (22369 UT) CAPS capsule Take 50,000 Units by mouth once a week MONDAY   Yes Historical Provider, MD   Olopatadine HCl (PATADAY OP) Apply 1 drop to eye daily as needed 0.1 %   Yes Historical Provider, MD   fexofenadine (ALLEGRA) 180 MG tablet Take 180 mg by mouth as needed   Yes Historical Provider, MD   ammonium lactate (LAC-HYDRIN) 12 % lotion APPLY TOPICALLY 2 TIMES A DAY 10/4/19  Yes Historical Provider, MD   lisinopril (PRINIVIL;ZESTRIL) 20 MG tablet  19  Yes Historical Provider, MD   metoprolol succinate (TOPROL XL) 25 MG extended release tablet TAKE 1 TABLET BY MOUTH AT BEDTIME 11/10/19  Yes Historical Provider, MD   spironolactone (ALDACTONE) 25 MG tablet TAKE 1 TABLET BY MOUTH EVERY DAY 11/10/19  Yes Historical Provider, MD   acetaminophen (TYLENOL) 500 MG tablet Take 500 mg by mouth every 6 hours as needed for Pain.    Yes Historical Provider, MD   simvastatin (ZOCOR) 40 MG tablet Take 40 mg by mouth every other day    Yes Historical Provider, MD       Current medications:    Current Facility-Administered Medications   Medication Dose Route Frequency Provider Last Rate Last Dose    0.9 % sodium chloride infusion   Intravenous Continuous Christine Elliott 20 mL/hr at 11/18/20 1813      sodium chloride flush 0.9 % injection 10 mL  10 mL Intravenous 2 times per day ELSA Elliott        sodium chloride flush 0.9 % injection 10 mL  10 mL Intravenous PRN ELSA Elliott        vancomycin 1.5 g in dextrose 5% 300 mL IVPB  1,500 mg Intravenous On Call to [address] [place] [address]           Allergies: Allergies   Allergen Reactions    Dye [Iodides] Hives    Pcn [Penicillins] Rash    Aspirin      ? Problem List:    Patient Active Problem List   Diagnosis Code    Severe aortic stenosis I35.0       Past Medical History:        Diagnosis Date    Aortic insufficiency 06/2020    Aortic stenosis 06/2020    Arthritis     Heart murmur     Hyperlipidemia     Hypertension     Night sweats        Past Surgical History:        Procedure Laterality Date    CATARACT REMOVAL WITH IMPLANT  2010    Bilateral     CHOLECYSTECTOMY      COLONOSCOPY      HYSTERECTOMY      PARTIAL    TRANSESOPHAGEAL ECHOCARDIOGRAM  08/20/2020    DR Ocampo       Social History:    Social History     Tobacco Use    Smoking status: Never Smoker    Smokeless tobacco: Never Used   Substance Use Topics    Alcohol use:  No                                Counseling given: Not Answered      Vital Signs (Current):   Vitals:    11/18/20 0524 11/18/20 0554   BP: (!) 176/81    Pulse: 108    Resp: 20    Temp: 36.4 °C (97.5 °F)    SpO2: 96%    Weight:  200 lb (90.7 kg)   Height:  5' 5\" (1.651 m)                                              BP Readings from Last 3 Encounters:   11/18/20 (!) 176/81   11/16/20 129/60   09/25/20 (!) 138/55       NPO Status: Time of last liquid consumption: 2330                        Time of last solid consumption: 2000                        Date of last liquid consumption: 11/17/20                        Date of last solid food consumption: 11/17/20    BMI:   Wt Readings from Last 3 Encounters:   11/18/20 200 lb (90.7 kg) atrial appendage thrombus. No evidence of interatrial shunting on bubble study. Right Atrium   Dilated right atrium. Mitral Valve   Mild mitral regurgitation. No evidence of hemodynamically significant mitral stenosis. Tricuspid Valve   Physiologic and/or trace tricuspid regurgitation. Aortic Valve   The aortic valve is trileaflet. Severe aortic regurgitation. Moderate aortic stenosis (AV peak velocity 2.65 m/s, AV mean gradient 31   mmHg, AV area 1.2 cm2, dimensionless index 0.28). Pulmonic Valve   No evidence of hemodynamically significant pulmonic regurgitation. Pericardial Effusion   No evidence of a hemodynamically significant pericardial effusion. Aorta   Aortic root dimension within normal limits. Conclusions      Summary   Normal left ventricular systolic function. Ejection fraction is visually estimated at 55-60%. Normal right ventricular size and function. Mild mitral regurgitation. The aortic valve is trileaflet. Severe aortic regurgitation. Moderate aortic stenosis (AV peak velocity 2.65 m/s, AV mean gradient 31   mmHg, AV area 1.2 cm2, dimensionless index 0.28). Carotid Ultrasound 11/16/2020  Impression    Atherosclerotic disease. No hemodynamically significant stenosis is    identified    Estimated stenosis by NASCET criteria in the proximal right carotid    artery is between 0% and 49%. Estimated stenosis by NASCET criteria in the proximal left carotid    artery is between 0% and 49%.         Anesthesia Evaluation  Patient summary reviewed and Nursing notes reviewed no history of anesthetic complications:   Airway: Mallampati: II  TM distance: >3 FB   Neck ROM: full  Mouth opening: > = 3 FB Dental:      Comment: Pt denies loose or chipped teeth    Pulmonary:Negative Pulmonary ROS and normal exam  breath sounds clear to auscultation                             Cardiovascular:    (+) hypertension:, valvular problems/murmurs: AS and AI, hyperlipidemia      ECG reviewed  Rhythm: regular  Rate: normal  Echocardiogram reviewed         Beta Blocker:  Dose within 24 Hrs         Neuro/Psych:   Negative Neuro/Psych ROS              GI/Hepatic/Renal:   (+) GERD:,           Endo/Other:    (+) : arthritis: OA., .                 Abdominal:           Vascular: negative vascular ROS. Anesthesia Plan      general     ASA 4       Induction: intravenous. arterial line, BIS, central line, CVP and CHAI  MIPS: Postoperative opioids intended and Prophylactic antiemetics administered. Anesthetic plan and risks discussed with patient. Use of blood products discussed with patient whom consented to blood products. Plan discussed with CRNA and attending. Destiny Beaver RN   11/18/2020  DOS STAFF ADDENDUM:    Pt seen and examined, chart reviewed (including anesthesia, drug and allergy history). Anesthetic plan, risks, benefits, alternatives, and personnel involved discussed with patient. Patient verbalized an understanding and agrees to proceed. Plan discussed with care team members and agreed upon.     Ester Wells MD  Staff Anesthesiologist  6:59 AM

## 2020-11-18 NOTE — ANESTHESIA PROCEDURE NOTES
Procedure Performed: CHAI     Start Time:        End Time:        General Procedure Information  Diagnostic Indications for Echo:  assessment of ascending aorta, hemodynamic monitoring and assessment of valve function  Location performed:  OR  Intubated  Heart visualized  Probe Type:  3D  Modalities:  Color flow mapping, 2D only, continuous wave Doppler and pulse wave Doppler    Echocardiographic and Doppler Measurements    Ventricles    Right Ventricle:  Cavity size normal.  Hypertrophy not present. Thrombus not present. Left Ventricle:  Cavity size normal.  Hypertrophy not present. Thrombus not present. Ejection Fraction 74%. Ventricular Regional Function:  1- Basal Anteroseptal:  normal  2- Basal Anterior:  normal  3- Basal Anterolateral:  normal  4- Basal Inferolateral:  normal  5- Basal Inferior:  normal  6- Basal Inferoseptal:  normal  7- Mid Anteroseptal:  normal  8- Mid Anterior:  normal  9- Mid Anterolateral:  normal  10- Mid Inferolateral:  normal  11- Mid Inferior:  normal  12- Mid Inferoseptal:  normal  13- Apical Anterior:  normal  15- Apical Inferior:  normal  16- Apical Septal:  normal  17- [place]:  normal      Valves    Aortic Valve: Annulus calcified. Stenosis moderate. Regurgitation moderate. Leaflets calcified. Leaflet motions restricted. Specific leaflet segments with abnormal motions are described in the following comments:       left    Mitral Valve: Annulus normal.  Stenosis not present. Regurgitation mild. Leaflets normal.  Leaflet motions normal.      Tricuspid Valve: Annulus normal.  Stenosis not present. Regurgitation none. Leaflets normal.  Leaflet motions normal.    Pulmonic Valve: Annulus normal.  Stenosis not present. Regurgitation none. Other Valve Findings:        Moderate to severe aortic stenosis due to calcified and restricted leaflets motion and moderate AI by color Doppler due to left leaflet in noncoapting position, SALIMA by continuity 1.2 cm2, mean gradient 25  mmHg. Aorta    Ascending Aorta:  Size normal.    Aortic Arch:  Size normal.    Descending Aorta:  Size normal.          Atria    Right Atrium:  Size normal.    Left Atrium:  Size normal.  Left atrial appendage normal.      Septa    Atrial Septum:  Intra-atrial septal morphology normal.      Ventricular Septum:  Intra-ventricular septum morphology normal.      Diastolic Function Measurements:  Diastolic Dysfunction Grade= indeterminate  E= ms  A= ms  E/A Ratio=   DT= ms  S/D=  IVRT=    Other Findings  Pericardium:  normal      Anesthesia Information  Performed Personally  Anesthesiologist:  Ruddy Lloyd MD      Echocardiogram Comments:       S/P TAVR Libby #26 mm, via femoral access  no residual intravalvular insufficiency, well seated and no perivalvular leak, patent left main flow, mean gradient 4-5 mmHg, no pericardial effusion. Report given to Dr. Ailyn Choudhury intraoperatively.

## 2020-11-18 NOTE — PROGRESS NOTES
HEART VALVE TEAM PROGRESS NOTE    Evaluated in ICU POD # 0. No complaints. No pain in groins. No issues since admission to ICU. PE:   /78   Pulse 78   Temp 96.3 °F (35.7 °C)   Resp 12   Ht 5' 5\" (1.651 m)   Wt 200 lb (90.7 kg)   SpO2 100%   BMI 33.28 kg/m²   CARDIOVASCULAR:   Heart Inspection shows no noted pulsations  Heart Palpation: no heaves or thrills, PMI in 5th ISC near left midclavicular line   Heart Ausculation -Normal S1 and S2, RRR, no murmur, s3, s4 or rub noted. PV: trace extremity edema. No varicosities. Pedal pulses with +DP/PT signals, no clubbing or cyanosis     Monitor: SR       Lab Review     Postop Labs pending     Recent Labs     11/16/20  1245   WBC 7.8   HGB 12.1   HCT 38.8          Recent Labs     11/16/20  1245      K 4.1      CO2 26   BUN 18   CREATININE 1.3*       Recent Labs     11/16/20  1245   AST 17   ALT 13   ALKPHOS 86       No results for input(s): BNP, CKTOTAL, CKMB in the last 72 hours. Recent Labs     11/16/20  1245   INR 1.0         Assessment:  1. POD # 0 TAVR using a 26-mm YAQUELIN 3 Ultra valve. 2. Stable groin sites bilaterally (right groin with desmond dressing intact, left groin with DSD)   3. HTN- Started on Cleviprex gtt   4. CKD    Plan:  1. ASA indefinitely   2.  Continue ICU care for 6 hours, if stable then transfer to CSU

## 2020-11-18 NOTE — ANESTHESIA POSTPROCEDURE EVALUATION
Department of Anesthesiology  Postprocedure Note    Patient: Paolo Méndez  MRN: [id number]  [place]: 1945  Date of evaluation: 11/18/2020  Time:  9:54 AM     Procedure Summary     Date:  11/18/20 Room / Location:  [place] [address] / [facility]    Anesthesia Start:  4245 Anesthesia Stop:  9513    Procedures:       TRANSCATHETER AORTIC VALVE REPLACEMENT FEMORAL APPROACH (N/A )      TRANSESOPHAGEAL ECHOCARDIOGRAM (N/A ) Diagnosis:  (AORTIC STENOSIS)    Surgeon:  Randy Chan MD Responsible Provider:  Óscar Lam MD    Anesthesia Type:  general ASA Status:  4          Anesthesia Type: general    Murphy Phase I: Murphy Score: 10    Murphy Phase II:      Last vitals: Reviewed and per EMR flowsheets.        Anesthesia Post Evaluation    Patient location during evaluation: PACU  Patient participation: complete - patient participated  Level of consciousness: awake  Pain score: 0  Airway patency: patent  Nausea & Vomiting: no nausea  Complications: no  Cardiovascular status: hemodynamically stable  Respiratory status: acceptable  Hydration status: stable

## 2020-11-18 NOTE — ANESTHESIA PROCEDURE NOTES
Arterial Line:    An arterial line was placed using surface landmarks, in the OR for the following indication(s): continuous blood pressure monitoring and blood sampling needed. A 22 gauge (size), 1 and 3/4 inch (length), Arrow (type) catheter was placed, Seldinger technique not used, into the right radial artery, secured by tape and Tegaderm. Anesthesia type: General    Events:  patient tolerated procedure well with no complications. Anesthesiologist: Angela Rosen MD  Resident/CRNA: DAMIEN Holloway CRNA  Other anesthesia staff: Cabrera Urbina RN  Performed:  Other anesthesia staff and Resident/CRNA   Preanesthetic Checklist  Completed: patient identified, site marked, surgical consent, pre-op evaluation, timeout performed, IV checked, risks and benefits discussed, monitors and equipment checked, anesthesia consent given, oxygen available and patient being monitored

## 2020-11-19 VITALS
DIASTOLIC BLOOD PRESSURE: 54 MMHG | RESPIRATION RATE: 16 BRPM | HEART RATE: 72 BPM | BODY MASS INDEX: 33.32 KG/M2 | TEMPERATURE: 98.5 F | WEIGHT: 200 LBS | SYSTOLIC BLOOD PRESSURE: 123 MMHG | OXYGEN SATURATION: 95 % | HEIGHT: 65 IN

## 2020-11-19 LAB
ANION GAP SERPL CALCULATED.3IONS-SCNC: 14 MMOL/L (ref 7–16)
BUN BLDV-MCNC: 19 MG/DL (ref 8–23)
CALCIUM SERPL-MCNC: 9.1 MG/DL (ref 8.6–10.2)
CHLORIDE BLD-SCNC: 104 MMOL/L (ref 98–107)
CO2: 19 MMOL/L (ref 22–29)
CREAT SERPL-MCNC: 1.3 MG/DL (ref 0.5–1)
EKG ATRIAL RATE: 91 BPM
EKG P AXIS: 4 DEGREES
EKG P-R INTERVAL: 108 MS
EKG Q-T INTERVAL: 374 MS
EKG QRS DURATION: 96 MS
EKG QTC CALCULATION (BAZETT): 460 MS
EKG R AXIS: 44 DEGREES
EKG T AXIS: 149 DEGREES
EKG VENTRICULAR RATE: 91 BPM
GFR AFRICAN AMERICAN: 48
GFR NON-AFRICAN AMERICAN: 48 ML/MIN/1.73
GLUCOSE BLD-MCNC: 161 MG/DL (ref 74–99)
HCT VFR BLD CALC: 36.5 % (ref 34–48)
HEMOGLOBIN: 12.1 G/DL (ref 11.5–15.5)
MCH RBC QN AUTO: 26.1 PG (ref 26–35)
MCHC RBC AUTO-ENTMCNC: 33.2 % (ref 32–34.5)
MCV RBC AUTO: 78.7 FL (ref 80–99.9)
METER GLUCOSE: 115 MG/DL (ref 74–99)
METER GLUCOSE: 120 MG/DL (ref 74–99)
PDW BLD-RTO: 15.8 FL (ref 11.5–15)
PLATELET # BLD: 251 E9/L (ref 130–450)
PMV BLD AUTO: 11.8 FL (ref 7–12)
POC ACT LR: 142 SECONDS
POC ACT LR: 396 SECONDS
POTASSIUM SERPL-SCNC: 4.5 MMOL/L (ref 3.5–5)
RBC # BLD: 4.64 E12/L (ref 3.5–5.5)
SODIUM BLD-SCNC: 137 MMOL/L (ref 132–146)
WBC # BLD: 22.5 E9/L (ref 4.5–11.5)

## 2020-11-19 PROCEDURE — 93005 ELECTROCARDIOGRAM TRACING: CPT | Performed by: PHYSICIAN ASSISTANT

## 2020-11-19 PROCEDURE — 6360000002 HC RX W HCPCS: Performed by: PHYSICIAN ASSISTANT

## 2020-11-19 PROCEDURE — 82962 GLUCOSE BLOOD TEST: CPT

## 2020-11-19 PROCEDURE — 6370000000 HC RX 637 (ALT 250 FOR IP): Performed by: PHYSICIAN ASSISTANT

## 2020-11-19 PROCEDURE — 99233 SBSQ HOSP IP/OBS HIGH 50: CPT | Performed by: INTERNAL MEDICINE

## 2020-11-19 PROCEDURE — 93005 ELECTROCARDIOGRAM TRACING: CPT | Performed by: INTERNAL MEDICINE

## 2020-11-19 PROCEDURE — 36415 COLL VENOUS BLD VENIPUNCTURE: CPT

## 2020-11-19 PROCEDURE — 6360000002 HC RX W HCPCS: Performed by: INTERNAL MEDICINE

## 2020-11-19 PROCEDURE — 6360000002 HC RX W HCPCS: Performed by: NURSE PRACTITIONER

## 2020-11-19 PROCEDURE — 93010 ELECTROCARDIOGRAM REPORT: CPT | Performed by: INTERNAL MEDICINE

## 2020-11-19 PROCEDURE — 6370000000 HC RX 637 (ALT 250 FOR IP): Performed by: INTERNAL MEDICINE

## 2020-11-19 PROCEDURE — 2580000003 HC RX 258: Performed by: PHYSICIAN ASSISTANT

## 2020-11-19 PROCEDURE — 93308 TTE F-UP OR LMTD: CPT

## 2020-11-19 PROCEDURE — 80048 BASIC METABOLIC PNL TOTAL CA: CPT

## 2020-11-19 PROCEDURE — 85027 COMPLETE CBC AUTOMATED: CPT

## 2020-11-19 PROCEDURE — C9248 INJ, CLEVIDIPINE BUTYRATE: HCPCS | Performed by: NURSE PRACTITIONER

## 2020-11-19 RX ORDER — AMLODIPINE BESYLATE 5 MG/1
5 TABLET ORAL DAILY
Qty: 90 TABLET | Refills: 3 | Status: SHIPPED | OUTPATIENT
Start: 2020-11-20

## 2020-11-19 RX ORDER — LISINOPRIL 20 MG/1
20 TABLET ORAL 2 TIMES DAILY
Qty: 180 TABLET | Refills: 3 | Status: SHIPPED | OUTPATIENT
Start: 2020-11-19

## 2020-11-19 RX ORDER — METOPROLOL SUCCINATE 50 MG/1
25 TABLET, EXTENDED RELEASE ORAL DAILY
Status: DISCONTINUED | OUTPATIENT
Start: 2020-11-19 | End: 2020-11-19 | Stop reason: HOSPADM

## 2020-11-19 RX ORDER — CLOPIDOGREL BISULFATE 75 MG/1
75 TABLET ORAL DAILY
Qty: 30 TABLET | Refills: 5 | Status: SHIPPED | OUTPATIENT
Start: 2020-11-20

## 2020-11-19 RX ADMIN — HYDRALAZINE HYDROCHLORIDE 10 MG: 20 INJECTION, SOLUTION INTRAMUSCULAR; INTRAVENOUS at 03:47

## 2020-11-19 RX ADMIN — CLEVIPIDINE 30 MG/HR: 0.5 EMULSION INTRAVENOUS at 00:05

## 2020-11-19 RX ADMIN — SPIRONOLACTONE 25 MG: 25 TABLET ORAL at 08:50

## 2020-11-19 RX ADMIN — HYDRALAZINE HYDROCHLORIDE 10 MG: 20 INJECTION, SOLUTION INTRAMUSCULAR; INTRAVENOUS at 09:59

## 2020-11-19 RX ADMIN — VANCOMYCIN HYDROCHLORIDE 1.5 G: 10 INJECTION, POWDER, LYOPHILIZED, FOR SOLUTION INTRAVENOUS at 00:00

## 2020-11-19 RX ADMIN — METOPROLOL SUCCINATE 25 MG: 50 TABLET, EXTENDED RELEASE ORAL at 17:01

## 2020-11-19 RX ADMIN — AMLODIPINE BESYLATE 5 MG: 5 TABLET ORAL at 08:50

## 2020-11-19 RX ADMIN — CLEVIPIDINE 28 MG/HR: 0.5 EMULSION INTRAVENOUS at 00:51

## 2020-11-19 RX ADMIN — SODIUM CHLORIDE, PRESERVATIVE FREE 10 ML: 5 INJECTION INTRAVENOUS at 08:56

## 2020-11-19 RX ADMIN — CLEVIPIDINE 12 MG/HR: 0.5 EMULSION INTRAVENOUS at 05:20

## 2020-11-19 RX ADMIN — CLEVIPIDINE 28 MG/HR: 0.5 EMULSION INTRAVENOUS at 01:55

## 2020-11-19 RX ADMIN — CLEVIPIDINE 24 MG/HR: 0.5 EMULSION INTRAVENOUS at 03:59

## 2020-11-19 RX ADMIN — OXYCODONE AND ACETAMINOPHEN 1 TABLET: 5; 325 TABLET ORAL at 06:21

## 2020-11-19 RX ADMIN — LISINOPRIL 20 MG: 20 TABLET ORAL at 08:50

## 2020-11-19 RX ADMIN — ATORVASTATIN CALCIUM 20 MG: 20 TABLET, FILM COATED ORAL at 08:50

## 2020-11-19 RX ADMIN — CLEVIPIDINE 28 MG/HR: 0.5 EMULSION INTRAVENOUS at 02:56

## 2020-11-19 RX ADMIN — CLOPIDOGREL 75 MG: 75 TABLET, FILM COATED ORAL at 08:50

## 2020-11-19 ASSESSMENT — PAIN SCALES - GENERAL
PAINLEVEL_OUTOF10: 0
PAINLEVEL_OUTOF10: 0
PAINLEVEL_OUTOF10: 9
PAINLEVEL_OUTOF10: 0

## 2020-11-19 ASSESSMENT — PAIN DESCRIPTION - PAIN TYPE: TYPE: SURGICAL PAIN

## 2020-11-19 ASSESSMENT — PAIN DESCRIPTION - FREQUENCY: FREQUENCY: INTERMITTENT

## 2020-11-19 ASSESSMENT — PAIN DESCRIPTION - DESCRIPTORS: DESCRIPTORS: ACHING;DISCOMFORT

## 2020-11-19 ASSESSMENT — PAIN DESCRIPTION - ORIENTATION: ORIENTATION: RIGHT;LEFT

## 2020-11-19 ASSESSMENT — PAIN DESCRIPTION - LOCATION: LOCATION: GROIN

## 2020-11-19 NOTE — PROGRESS NOTES
TAVR TEAM PROGRESS NOTE      POD # 1 TAVR with 26-mm YAQUELIN 3 Ultra; Jett De La Paz/Argenis    TAVR access: right common femoral artery surgical cutdown approach  Pigtail access: left common femoral artery    Subjective:    Doing well today. No complaints   Was markedly hypertensive in the postop setting. Better controlled now   White cell count 22.5 from 10.1 without fever problem. She had received steroids for contrast allergy.          Current Facility-Administered Medications   Medication Dose Route Frequency Provider Last Rate Last Dose    acetaminophen (TYLENOL) tablet 500 mg  500 mg Oral Q6H PRN ELSA Elliott        metoprolol succinate (TOPROL XL) extended release tablet 25 mg  25 mg Oral Nightly ELSA Elliott   25 mg at 11/18/20 1950    olopatadine (PATANOL) 0.1 % ophthalmic solution 1 drop  1 drop Ophthalmic Daily PRN ELSA Elliott        atorvastatin (LIPITOR) tablet 20 mg  20 mg Oral Daily ELSA Elliott   20 mg at 11/19/20 0850    spironolactone (ALDACTONE) tablet 25 mg  25 mg Oral Daily ELSA Elliott   25 mg at 11/19/20 0850    sodium chloride flush 0.9 % injection 10 mL  10 mL Intravenous 2 times per day ELSA Elliott   10 mL at 11/19/20 0856    sodium chloride flush 0.9 % injection 10 mL  10 mL Intravenous PRN ELSA Elliott        oxyCODONE-acetaminophen (PERCOCET) 5-325 MG per tablet 1 tablet  1 tablet Oral Q4H PRN ELSA Elliott   1 tablet at 11/19/20 0621    ondansetron (ZOFRAN) injection 4 mg  4 mg Intravenous Q8H PRN ELSA Elliott        insulin lispro (HUMALOG) injection vial 0-12 Units  0-12 Units Subcutaneous TID WC ELSA Elliott   2 Units at 11/18/20 2044    insulin lispro (HUMALOG) injection vial 0-6 Units  0-6 Units Subcutaneous Nightly ELSA Elliott        glucose (GLUTOSE) 40 % oral gel 15 g  15 g Oral PRN ELSA Elliott        dextrose 50 % IV solution  12.5 g Intravenous PRN Naa JEFFERSON affect, cooperative  Skin: Color, texture, and turgor normal for age     Access sites: minimal ecchymoses. No hematoma or pulsatile masses. Distal pulses normal b/l. REN dressing in right groin. Lab Review     Recent Labs     11/16/20  1245 11/18/20  0930 11/19/20  0420   WBC 7.8 10.1 22.5*   HGB 12.1 11.7 12.1   HCT 38.8 36.4 36.5    225 251       Recent Labs     11/16/20  1245 11/18/20  0930 11/18/20  1630 11/19/20  0420    137  --  137   K 4.1 3.8 3.7 4.5    105  --  104   CO2 26 21*  --  19*   BUN 18 24*  --  19   CREATININE 1.3* 1.1*  --  1.3*       Recent Labs     11/16/20  1245   AST 17   ALT 13   ALKPHOS 86       No results for input(s): BNP, CKTOTAL, CKMB in the last 72 hours. Recent Labs     11/16/20  1245   INR 1.0       EKG pre-TAVR 8/12/2020: Normal sinus rhythm and normal conduction    EKG today: Normal sinus rhythm, normal conduction. LVH by voltage and nonspecific ST ST-T changes    POD1 TTE:    **Limited study POD1 TAVR with 26-mm YAQUELIN 3 Ultra**    Normal left ventricle size. Estimated left ventricle ejection fraction 65    %.    Normal right ventricular size and function.    Well-seated THV in the aortic position. No central or paravalvular    regurgitation. MG 11, DI 0.62, SALIMA 2.4, AVAi 1.2       Assessment:  79-year-old -American female, patient of Dr. Diane Alvarez, with hypertension, CKD, obesity, with symptomatic severe AR and moderate aortic stenosis and no significant CAD was very adamant against open heart surgery. She underwent uncomplicated transfemoral TAVR with 26 mm YAQUELIN 3 Ultra yesterday. Her vitals, labs, EKG/telemetry, TTE were reviewed as above. Plan:  1. Increased her lisinopril to 20 mg p.o. twice daily. Added amlodipine 5 mg daily. continue metoprolol XL. 2. Antithrombotic therapy: Clopidogrel monotherapy due to ?aspirin allergy  3. Access site check in 2 weeks  4. Valve Clinic follow-up in 4 weeks  5.  Endocarditis prophylaxis for 6 months before high-risk procedures  6.  Anticipate discharge today      Kale Mills MD, [facility] [place]  Interventional Cardiology/Structural Heart Disease

## 2020-11-19 NOTE — FLOWSHEET NOTE
Given discharge instructions,verbalized understanding of. Discharged via w/c with pca. Met family member at front door.

## 2020-11-19 NOTE — PLAN OF CARE
Problem: Pain:  Goal: Pain level will decrease  Description: Pain level will decrease  Outcome: Met This Shift  Goal: Control of acute pain  Description: Control of acute pain  Outcome: Met This Shift  Goal: Control of chronic pain  Description: Control of chronic pain  Outcome: Met This Shift     Problem: Falls - Risk of:  Goal: Will remain free from falls  Description: Will remain free from falls  Outcome: Met This Shift  Goal: Absence of physical injury  Description: Absence of physical injury  Outcome: Met This Shift     Problem: Anxiety/Stress:  Goal: Level of anxiety will decrease  Description: Level of anxiety will decrease  Outcome: Met This Shift     Problem: Pain:  Goal: Pain level will decrease  Description: Pain level will decrease  Outcome: Met This Shift

## 2020-11-20 LAB
BLOOD BANK DISPENSE STATUS: NORMAL
BLOOD BANK PRODUCT CODE: NORMAL
BPU ID: NORMAL
DESCRIPTION BLOOD BANK: NORMAL
EKG ATRIAL RATE: 84 BPM
EKG P-R INTERVAL: 138 MS
EKG Q-T INTERVAL: 394 MS
EKG QRS DURATION: 90 MS
EKG QTC CALCULATION (BAZETT): 465 MS
EKG R AXIS: 10 DEGREES
EKG T AXIS: 154 DEGREES
EKG VENTRICULAR RATE: 84 BPM

## 2020-11-20 PROCEDURE — 93010 ELECTROCARDIOGRAM REPORT: CPT | Performed by: INTERNAL MEDICINE

## 2020-11-20 NOTE — DISCHARGE SUMMARY
Patient ID:  Mehul Gibson  [id number]  89 y.o.  1945    Admit date: 11/18/2020    Discharge date and time: 11/19/2020  5:51 PM     Admitting Physician: Héctor Rosas MD     Discharge Physician: Héctor Rosas MD    Admission Diagnoses: Severe aortic stenosis [I35.0]  Severe aortic stenosis [I35.0]    Discharge Diagnoses:   1. Severe symptomatic AS. 2. Successful transcutaneous aortic valve replacement (TAVR) with 26mm Snell Libby S3 valve. Admission Condition: fair    Discharged Condition: good    Indication for Admission: 70 y.o. admitted post TAVR for severe symptomatic AS. Hospital Course: 70 y.o.  [place] female admitted after elective TAVR procedure for severe symptomatic AS. TAVR was performed with a 26mm Snell Libby S3 valve on 11/18/20 using b/l groin approach. TTE POD #1: SALIMA 2.4 cm2, MG 11 mmHg, DI 0.63, no central or perivalvular regurgitation. She was observed in PACU for few hours and was transferred to OR overflow on POD # 0. She was ambulated in the hallways with no significant SOB or CP. B/l groin sites were stable and distal pulses were normal. She had uneventful post procedure course and was discharged on POD # 1. Discharge Exam:  Unchanged. Disposition: home    Patient Instructions: Activity: as per discharge instructions  Diet: regular diet  Wound Care: as directed    Follow Up:   1. With valve clinic in 2 weeks. Time Spent on discharge is more than 31 min in the examination, evaluation, counseling and review of medications and discharge plan.       Signed:  Larissa Gifford PA-C  11/20/2020  5:31 PM

## 2020-12-03 ENCOUNTER — HOSPITAL ENCOUNTER (OUTPATIENT)
Dept: NON INVASIVE DIAGNOSTICS | Age: 75
Discharge: HOME | End: 2020-12-03
Payer: MEDICARE

## 2020-12-03 VITALS
HEART RATE: 74 BPM | DIASTOLIC BLOOD PRESSURE: 65 MMHG | BODY MASS INDEX: 32.82 KG/M2 | WEIGHT: 197 LBS | TEMPERATURE: 97 F | HEIGHT: 65 IN | SYSTOLIC BLOOD PRESSURE: 138 MMHG

## 2020-12-03 PROCEDURE — 99213 OFFICE O/P EST LOW 20 MIN: CPT | Performed by: PHYSICIAN ASSISTANT

## 2020-12-03 PROCEDURE — 99211 OFF/OP EST MAY X REQ PHY/QHP: CPT

## 2020-12-03 NOTE — PROGRESS NOTES
[facility]  Visit Note      Patient name: Agustina Reeves    Reason for visit: TAVR follow up     Primary Care Physician: Dea Lagunas MD    Date of service: 12/3/2020    Chief Complaint: TAVR follow up - incision check    HPI: Mrs. Elias Person presents for follow up s/p TAVR on 11/18/2020. She is doing well. She denies chest discomfort, SOB/GARCIA, orthopnea, PND, LE edema, palpitations or syncope. She notes pain/sensitivity to touch in the right thigh, but notes it is improving over time. She also notes soreness in the right arm since the procedure. Allergies: Allergies   Allergen Reactions    Dye [Iodides] Hives    Pcn [Penicillins] Rash    Aspirin      ? Home medications:    Current Outpatient Medications   Medication Sig Dispense Refill    lisinopril (PRINIVIL;ZESTRIL) 20 MG tablet Take 1 tablet by mouth 2 times daily 180 tablet 3    amLODIPine (NORVASC) 5 MG tablet Take 1 tablet by mouth daily 90 tablet 3    clopidogrel (PLAVIX) 75 MG tablet Take 1 tablet by mouth daily 30 tablet 5    vitamin D (ERGOCALCIFEROL) 1.25 MG (61202 UT) CAPS capsule Take 50,000 Units by mouth once a week MONDAY      Olopatadine HCl (PATADAY OP) Apply 1 drop to eye daily as needed 0.1 %      fexofenadine (ALLEGRA) 180 MG tablet Take 180 mg by mouth as needed      ammonium lactate (LAC-HYDRIN) 12 % lotion APPLY TOPICALLY 2 TIMES A DAY  5    metoprolol succinate (TOPROL XL) 25 MG extended release tablet TAKE 1 TABLET BY MOUTH AT BEDTIME  5    spironolactone (ALDACTONE) 25 MG tablet TAKE 1 TABLET BY MOUTH EVERY DAY  5    acetaminophen (TYLENOL) 500 MG tablet Take 500 mg by mouth every 6 hours as needed for Pain.  simvastatin (ZOCOR) 40 MG tablet Take 40 mg by mouth every other day       diphenhydrAMINE (BENADRYL ALLERGY) 25 MG capsule Take 25 mg by mouth once Per instructions of ELSA Saab       No current facility-administered medications for this encounter.         Past Medical History:  Past Medical History:   Diagnosis Date    Aortic insufficiency 06/2020    Aortic stenosis 06/2020    Arthritis     Heart murmur     Hyperlipidemia     Hypertension     Night sweats        Past Surgical History:  Past Surgical History:   Procedure Laterality Date    AORTIC VALVE REPLACEMENT N/A 11/18/2020    TRANSCATHETER AORTIC VALVE REPLACEMENT FEMORAL APPROACH performed by Sharyn Munoz MD at [facility] CATARACT REMOVAL WITH IMPLANT  2010    Bilateral     CHOLECYSTECTOMY      COLONOSCOPY      HYSTERECTOMY      PARTIAL    TRANSESOPHAGEAL ECHOCARDIOGRAM  08/20/2020    DR Ocampo    TRANSESOPHAGEAL ECHOCARDIOGRAM N/A 11/18/2020    TRANSESOPHAGEAL ECHOCARDIOGRAM performed by Sharyn Munoz MD at [facility] History:  Social History     Socioeconomic History    Marital status: Single     Spouse name: Not on file    Number of children: Not on file    Years of education: Not on file    Highest education level: Not on file   Occupational History    Not on file   Social Needs    Financial resource strain: Not on file    Food insecurity     Worry: Not on file     Inability: Not on file    Transportation needs     Medical: Not on file     Non-medical: Not on file   Tobacco Use    Smoking status: Never Smoker    Smokeless tobacco: Never Used   Substance and Sexual Activity    Alcohol use: No    Drug use: No    Sexual activity: Yes   Lifestyle    Physical activity     Days per week: Not on file     Minutes per session: Not on file    Stress: Not on file   Relationships    Social connections     Talks on phone: Not on file     Gets together: Not on file     Attends Adventism service: Not on file     Active member of club or organization: Not on file     Attends meetings of clubs or organizations: Not on file     Relationship status: Not on file    Intimate partner violence     Fear of current or ex partner: Not on file     Emotionally abused: Not on file     Physically abused: Not on file     Forced sexual activity: Not on file   Other Topics Concern    Not on file   Social History Narrative    Not on file       Family History:  Family History   Family history unknown: Yes       Review of Systems:  Constitutional: Denies fevers, chills, or weight loss. HEENT: Denies visual changes or hearing loss. Heart: As per HPI. Lungs: Denies shortness of breath, cough, or wheezing. Gastrointestinal: Denies nausea, vomiting, constipation, or diarrhea. Genitourinary: dysuria or hematuria. Psychiatric: Patient denies anxiety or depression. Neurologic: Patient denies weakness of the extremities, dizziness, or headaches. All other ROS checked and found to be negative. Objective:  Vitals /65 (Site: Right Upper Arm, Position: Sitting, Cuff Size: Medium Adult)   Pulse 74   Temp 97 °F (36.1 °C) (Temporal)   Ht 5' 5\" (1.651 m)   Wt 197 lb (89.4 kg)   BMI 32.78 kg/m²   General Appearance: Pleasant 70y.o. year old female who appears stated age. Communicates well, no acute distress. HEENT: Head is normocephalic, atraumatic. EOMs intact, PERRL. Trachea midline. Lungs: Normal respiratory rate and normal effort. She is not in respiratory distress. Breath sounds clear to auscultation. No wheezes. Heart: Normal rate. Regular rhythm. S1 normal and S2 normal. No murmur. Chest: Symmetric chest wall expansion. Extremities: Normal range of motion. No edema. RUE with full ROM, no swelling, erythema, ecchymosis    Neurological: Patient is alert and oriented to person, place and time. Patient has normal reflexes. Skin: Warm and dry. Abdomen: Abdomen is soft and non-distended. Bowel sounds are normal.    Pulses: Distal pulses are intact. Skin: Warm and dry without lesions. Groins: right groin incision in tact, well healed, non tender without erythema, ecchymosis, hematoma or drainage. Left groin soft, non tender without ecchymosis, mass or hematoma. Assessment:   1.  Mixed aortic valve disease (mod AS, severe AI) s/p TAVR - doing well      Plan:   1.  Follow up for 30 day echo      Electronically signed by Tahmina Taylor PA-C on 12/3/2020 at 9:20 AM

## 2021-01-14 ENCOUNTER — HOSPITAL ENCOUNTER (OUTPATIENT)
Dept: NON INVASIVE DIAGNOSTICS | Age: 76
Discharge: HOME | End: 2021-01-14
Payer: MEDICARE

## 2021-01-14 VITALS
BODY MASS INDEX: 32.82 KG/M2 | TEMPERATURE: 96.7 F | SYSTOLIC BLOOD PRESSURE: 127 MMHG | HEART RATE: 72 BPM | HEIGHT: 65 IN | DIASTOLIC BLOOD PRESSURE: 67 MMHG | WEIGHT: 197 LBS

## 2021-01-14 LAB
LV EF: 63 %
LVEF MODALITY: NORMAL

## 2021-01-14 PROCEDURE — 99213 OFFICE O/P EST LOW 20 MIN: CPT | Performed by: PHYSICIAN ASSISTANT

## 2021-01-14 PROCEDURE — 93306 TTE W/DOPPLER COMPLETE: CPT

## 2021-01-14 PROCEDURE — 99211 OFF/OP EST MAY X REQ PHY/QHP: CPT

## 2021-01-14 NOTE — PROGRESS NOTES
The [facility]  Visit Note      Patient name: Sincere Stewart    Reason for visit: TAVR follow up     Primary Care Physician: Arianna Wisdom MD    Date of service: 1/14/2021    Chief Complaint: TAVR follow up - 30 day    HPI: Mrs. Arturo Escalante presents for follow up s/p TAVR on 11/18/2020. She is still doing well. She denies chest discomfort, SOB/GARCIA, orthopnea, PND, LE edema, palpitations or syncope. She is exercising with a stationery bike at home for 15-30 minutes 3x/week. Her hope is to eventually return to the gym where she also walked around a track and lifted weights. She doesn't feel comfortable doing so right now due to [place]. She has follow up for pelvic mass scheduled with her OB/GYN. Allergies: Allergies   Allergen Reactions    Dye [Iodides] Hives    Pcn [Penicillins] Rash    Aspirin      ? Home medications:    Current Outpatient Medications   Medication Sig Dispense Refill    lisinopril (PRINIVIL;ZESTRIL) 20 MG tablet Take 1 tablet by mouth 2 times daily 180 tablet 3    amLODIPine (NORVASC) 5 MG tablet Take 1 tablet by mouth daily 90 tablet 3    clopidogrel (PLAVIX) 75 MG tablet Take 1 tablet by mouth daily 30 tablet 5    diphenhydrAMINE (BENADRYL ALLERGY) 25 MG capsule Take 25 mg by mouth once Per instructions of ELSA Whiteside      vitamin D (ERGOCALCIFEROL) 1.25 MG (48681 UT) CAPS capsule Take 50,000 Units by mouth once a week MONDAY      Olopatadine HCl (PATADAY OP) Apply 1 drop to eye daily as needed 0.1 %      fexofenadine (ALLEGRA) 180 MG tablet Take 180 mg by mouth as needed      ammonium lactate (LAC-HYDRIN) 12 % lotion APPLY TOPICALLY 2 TIMES A DAY  5    metoprolol succinate (TOPROL XL) 25 MG extended release tablet TAKE 1 TABLET BY MOUTH AT BEDTIME  5    spironolactone (ALDACTONE) 25 MG tablet TAKE 1 TABLET BY MOUTH EVERY DAY  5    acetaminophen (TYLENOL) 500 MG tablet Take 500 mg by mouth every 6 hours as needed for Pain.       simvastatin (ZOCOR) 40 MG tablet Take 40 mg by mouth every other day        No current facility-administered medications for this encounter.       Facility-Administered Medications Ordered in Other Encounters   Medication Dose Route Frequency Provider Last Rate Last Admin    perflutren lipid microspheres (DEFINITY) injection 1.65 mg  1.5 mL Intravenous ONCE PRN ELSA Hampton           Past Medical History:  Past Medical History:   Diagnosis Date    Aortic insufficiency 06/2020    Aortic stenosis 06/2020    Arthritis     Heart murmur     Hyperlipidemia     Hypertension     Night sweats        Past Surgical History:  Past Surgical History:   Procedure Laterality Date    AORTIC VALVE REPLACEMENT N/A 11/18/2020    TRANSCATHETER AORTIC VALVE REPLACEMENT FEMORAL APPROACH performed by Justin Naranjo MD at [facility] CATARACT REMOVAL WITH IMPLANT  2010    Bilateral     CHOLECYSTECTOMY      COLONOSCOPY      HYSTERECTOMY      PARTIAL    TRANSESOPHAGEAL ECHOCARDIOGRAM  08/20/2020    DR Ocampo    TRANSESOPHAGEAL ECHOCARDIOGRAM N/A 11/18/2020    TRANSESOPHAGEAL ECHOCARDIOGRAM performed by Justin Naranjo MD at [facility] History:  Social History     Socioeconomic History    Marital status: Single     Spouse name: Not on file    Number of children: Not on file    Years of education: Not on file    Highest education level: Not on file   Occupational History    Not on file   Social Needs    Financial resource strain: Not on file    Food insecurity     Worry: Not on file     Inability: Not on file   [employer] needs     Medical: Not on file     Non-medical: Not on file   Tobacco Use    Smoking status: Never Smoker    Smokeless tobacco: Never Used   Substance and Sexual Activity    Alcohol use: No    Drug use: No    Sexual activity: Yes   Lifestyle    Physical activity     Days per week: Not on file     Minutes per session: Not on file    Stress: Not on file   Relationships    Social connections     Talks on phone: Not on file     Gets together: Not on file     Attends Christianity service: Not on file     Active member of club or organization: Not on file     Attends meetings of clubs or organizations: Not on file     Relationship status: Not on file    Intimate partner violence     Fear of current or ex partner: Not on file     Emotionally abused: Not on file     Physically abused: Not on file     Forced sexual activity: Not on file   Other Topics Concern    Not on file   Social History Narrative    Not on file       Family History:  Family History   Family history unknown: Yes       Review of Systems:  Constitutional: Denies fevers, chills, or weight loss. HEENT: Denies visual changes or hearing loss. Heart: As per HPI. Lungs: Denies shortness of breath, cough, or wheezing. Gastrointestinal: Denies nausea, vomiting, constipation, or diarrhea. Genitourinary: dysuria or hematuria. Psychiatric: Patient denies anxiety or depression. Neurologic: Patient denies weakness of the extremities, dizziness, or headaches. All other ROS checked and found to be negative. Objective:  Vitals /67 (Site: Right Upper Arm, Position: Sitting, Cuff Size: Medium Adult)   Pulse 72   Temp 96.7 °F (35.9 °C) (Temporal)   Ht 5' 5\" (1.651 m)   Wt 197 lb (89.4 kg)   BMI 32.78 kg/m²   General Appearance: Pleasant 70y.o. year old female who appears stated age. Communicates well, no acute distress. HEENT: Head is normocephalic, atraumatic. EOMs intact, PERRL. Trachea midline. Lungs: Normal respiratory rate and normal effort. She is not in respiratory distress. Breath sounds clear to auscultation. No wheezes. Heart: Normal rate. Regular rhythm. S1 normal and S2 normal. No murmur. Chest: Symmetric chest wall expansion. Extremities: Normal range of motion. No edema. Neurological: Patient is alert and oriented to person, place and time. Skin: Warm and dry. Abdomen: Abdomen is soft and non-distended.  Bowel sounds are normal.    Pulses: Distal pulses are intact. Skin: Warm and dry without lesions. Assessment:   1. Mixed aortic valve disease (mod AS, severe AI) s/p TAVR - doing well. Mean gradient 14 mmHg by echo today  2. NYHA class I  3. Pelvic mass incidentally noted by CT - most likely benign by recent ultrasound per OB/GYN note  4. HTN - controlled      Plan:   1. Limited TTE in 1 month -the DVI and EOAi appear significantly reduced today compared to postop day 1 although it is possibly related to variation in technique. 2. Follow up with PCP, cardiology, OB/GYN as scheduled  3. Continue exercise as tolerated  4.  SBE prophylaxis reviewed      Electronically signed by Jayant Singletary PA-C on 1/14/2021 at 9:45 AM

## 2021-03-04 ENCOUNTER — IMMUNIZATION (OUTPATIENT)
Dept: PRIMARY CARE CLINIC | Age: 76
End: 2021-03-04
Payer: MEDICARE

## 2021-03-04 PROCEDURE — 0001A COVID-19, PFIZER VACCINE 30MCG/0.3ML DOSE: CPT | Performed by: NURSE PRACTITIONER

## 2021-03-04 PROCEDURE — 91300 COVID-19, PFIZER VACCINE 30MCG/0.3ML DOSE: CPT | Performed by: NURSE PRACTITIONER

## 2021-03-09 ENCOUNTER — HOSPITAL ENCOUNTER (OUTPATIENT)
Dept: CARDIOLOGY | Age: 76
Discharge: HOME | End: 2021-03-09
Payer: MEDICARE

## 2021-03-09 ENCOUNTER — OFFICE VISIT (OUTPATIENT)
Dept: CARDIOLOGY CLINIC | Age: 76
End: 2021-03-09
Payer: MEDICARE

## 2021-03-09 VITALS
DIASTOLIC BLOOD PRESSURE: 76 MMHG | SYSTOLIC BLOOD PRESSURE: 120 MMHG | BODY MASS INDEX: 35.79 KG/M2 | WEIGHT: 202 LBS | HEART RATE: 72 BPM | HEIGHT: 63 IN | TEMPERATURE: 96.9 F

## 2021-03-09 DIAGNOSIS — Z95.2 S/P TAVR (TRANSCATHETER AORTIC VALVE REPLACEMENT): Primary | ICD-10-CM

## 2021-03-09 PROCEDURE — 99212 OFFICE O/P EST SF 10 MIN: CPT | Performed by: PHYSICIAN ASSISTANT

## 2021-03-09 PROCEDURE — 93308 TTE F-UP OR LMTD: CPT

## 2021-03-09 NOTE — PROGRESS NOTES
[facility]  Visit Note      Patient name: Georgi Godinez    Reason for visit: TAVR follow up     Primary Care Physician: Jaspal Fernandez MD    Date of service: 3/9/2021    Chief Complaint: TAVR follow up     HPI: Mrs. Kulwinder Higginbotham presents for limited echo to recheck valve measurements and gradients to 30 day results. She is s/p TAVR on 11/18/2020. She is still doing well. She denies chest discomfort, SOB/GARCIA, orthopnea, PND, LE edema, palpitations or syncope. She is exercising with a [employer] bike at home for 15-30 minutes 3x/week. Her hope is to eventually return to the gym where she also walked around a track and lifted weights. She doesn't feel comfortable doing so right now due to COVID, but did get her first vaccination last week. Allergies: Allergies   Allergen Reactions    Dye [Iodides] Hives    Pcn [Penicillins] Rash    Aspirin      ? Home medications:    Current Outpatient Medications   Medication Sig Dispense Refill    lisinopril (PRINIVIL;ZESTRIL) 20 MG tablet Take 1 tablet by mouth 2 times daily 180 tablet 3    amLODIPine (NORVASC) 5 MG tablet Take 1 tablet by mouth daily 90 tablet 3    clopidogrel (PLAVIX) 75 MG tablet Take 1 tablet by mouth daily 30 tablet 5    vitamin D (ERGOCALCIFEROL) 1.25 MG (74793 UT) CAPS capsule Take 50,000 Units by mouth once a week MONDAY      Olopatadine HCl (PATADAY OP) Apply 1 drop to eye daily as needed 0.1 %      fexofenadine (ALLEGRA) 180 MG tablet Take 180 mg by mouth as needed      ammonium lactate (LAC-HYDRIN) 12 % lotion APPLY TOPICALLY 2 TIMES A DAY  5    metoprolol succinate (TOPROL XL) 25 MG extended release tablet TAKE 1 TABLET BY MOUTH AT BEDTIME  5    spironolactone (ALDACTONE) 25 MG tablet TAKE 1 TABLET BY MOUTH EVERY DAY  5    acetaminophen (TYLENOL) 500 MG tablet Take 500 mg by mouth every 6 hours as needed for Pain.       simvastatin (ZOCOR) 40 MG tablet Take 40 mg by mouth every other day       diphenhydrAMINE (BENADRYL ALLERGY) 25 MG capsule Take 25 mg by mouth once Per instructions of ELSA Abdalla       No current facility-administered medications for this visit.       Facility-Administered Medications Ordered in Other Visits   Medication Dose Route Frequency Provider Last Rate Last Admin    perflutren lipid microspheres (DEFINITY) injection 1.65 mg  1.5 mL Intravenous ONCE PRN ELSA Culver           Past Medical History:  Past Medical History:   Diagnosis Date    Aortic insufficiency 06/2020    Aortic stenosis 06/2020    Arthritis     Heart murmur     Hyperlipidemia     Hypertension     Night sweats        Past Surgical History:  Past Surgical History:   Procedure Laterality Date    AORTIC VALVE REPLACEMENT N/A 11/18/2020    TRANSCATHETER AORTIC VALVE REPLACEMENT FEMORAL APPROACH performed by Onur Paige MD at [facility] CATARACT REMOVAL WITH IMPLANT  2010    Bilateral     CHOLECYSTECTOMY      COLONOSCOPY      HYSTERECTOMY      PARTIAL    TRANSESOPHAGEAL ECHOCARDIOGRAM  08/20/2020    DR Ocampo    TRANSESOPHAGEAL ECHOCARDIOGRAM N/A 11/18/2020    TRANSESOPHAGEAL ECHOCARDIOGRAM performed by Onur Paige MD at [address] [place] [address] History:  Social History     Socioeconomic History    Marital status: Single     Spouse name: Not on file    Number of children: Not on file    Years of education: Not on file    Highest education level: Not on file   Occupational History    Not on file   Social Needs    Financial resource strain: Not on file    Food insecurity     Worry: Not on file     Inability: Not on file   [employer] needs     Medical: Not on file     Non-medical: Not on file   Tobacco Use    Smoking status: Never Smoker    Smokeless tobacco: Never Used   Substance and Sexual Activity    Alcohol use: No    Drug use: No    Sexual activity: Yes   Lifestyle    Physical activity     Days per week: Not on file     Minutes per session: Not on file    Stress: Not on file Abdomen: Abdomen is soft and non-distended. Bowel sounds are normal.    Pulses: Distal pulses are intact. Skin: Warm and dry without lesions. Assessment:   1. Mixed aortic valve disease (mod AS, severe AI) s/p TAVR - doing well. Mean gradient 15 mmHg by echo today  2. NYHA class I      Plan:   1. Follow up in structural clinic 11/16/21 for one year visit  2. Follow up with PCP, cardiology, OB/GYN as scheduled  3. Continue exercise as tolerated  4.  SBE prophylaxis reviewed      Electronically signed by Henrietta Dudley PA-C on 3/9/2021 at 10:42 AM

## 2021-03-09 NOTE — PATIENT INSTRUCTIONS
Follow up 11/16/2021 at 8:30 am for echocardiogram    Follow up with PCP and cardiology as scheduled    Reminder: antibiotic required prior to dental appointments

## 2021-03-30 ENCOUNTER — IMMUNIZATION (OUTPATIENT)
Dept: PRIMARY CARE CLINIC | Age: 76
End: 2021-03-30
Payer: MEDICARE

## 2021-03-30 PROCEDURE — 91300 COVID-19, PFIZER VACCINE 30MCG/0.3ML DOSE: CPT | Performed by: NURSE PRACTITIONER

## 2021-03-30 PROCEDURE — 0002A COVID-19, PFIZER VACCINE 30MCG/0.3ML DOSE: CPT | Performed by: NURSE PRACTITIONER

## 2021-11-23 ENCOUNTER — HOSPITAL ENCOUNTER (OUTPATIENT)
Dept: CARDIOLOGY | Age: 76
Discharge: HOME | End: 2021-11-23
Payer: MEDICARE

## 2021-11-23 ENCOUNTER — OFFICE VISIT (OUTPATIENT)
Dept: CARDIOLOGY CLINIC | Age: 76
End: 2021-11-23
Payer: MEDICARE

## 2021-11-23 VITALS
TEMPERATURE: 98.4 F | DIASTOLIC BLOOD PRESSURE: 70 MMHG | HEART RATE: 60 BPM | WEIGHT: 201 LBS | BODY MASS INDEX: 34.31 KG/M2 | SYSTOLIC BLOOD PRESSURE: 140 MMHG | HEIGHT: 64 IN

## 2021-11-23 DIAGNOSIS — Z95.2 S/P TAVR (TRANSCATHETER AORTIC VALVE REPLACEMENT): Primary | ICD-10-CM

## 2021-11-23 LAB
LV EF: 60 %
LVEF MODALITY: NORMAL

## 2021-11-23 PROCEDURE — 99213 OFFICE O/P EST LOW 20 MIN: CPT | Performed by: PHYSICIAN ASSISTANT

## 2021-11-23 PROCEDURE — 93306 TTE W/DOPPLER COMPLETE: CPT

## 2021-11-23 NOTE — PATIENT INSTRUCTIONS
Follow up in structural heart clinic 11/22/2022 at 9:30 am for echocardiogram; call sooner if concerns arise in the meantime    Follow up with Jett Jha and Terrence as scheduled    Reminder: antibiotic required prior to dental appointments

## 2021-11-23 NOTE — PROGRESS NOTES
[facility]  Visit Note      Patient name: Ana Lilia Palacios    Reason for visit: TAVR follow up     Primary Care Physician: Jessica Mcfarlane MD    Date of service: 11/23/2021    Chief Complaint: TAVR follow up - one year    HPI: Mrs. Siri Avelar presents for follow up s/p TAVR on 11/18/2020. She is still doing well. She denies chest discomfort, SOB/GARCIA, orthopnea, PND, LE edema, palpitations or syncope. She has not required hospitalization since last visit. Allergies: Allergies   Allergen Reactions    Dye [Iodides] Hives    Pcn [Penicillins] Rash    Aspirin      ? Home medications:    Current Outpatient Medications   Medication Sig Dispense Refill    lisinopril (PRINIVIL;ZESTRIL) 20 MG tablet Take 1 tablet by mouth 2 times daily 180 tablet 3    amLODIPine (NORVASC) 5 MG tablet Take 1 tablet by mouth daily 90 tablet 3    clopidogrel (PLAVIX) 75 MG tablet Take 1 tablet by mouth daily 30 tablet 5    vitamin D (ERGOCALCIFEROL) 1.25 MG (64345 UT) CAPS capsule Take 50,000 Units by mouth once a week MONDAY      Olopatadine HCl (PATADAY OP) Apply 1 drop to eye daily as needed 0.1 %      fexofenadine (ALLEGRA) 180 MG tablet Take 180 mg by mouth as needed      ammonium lactate (LAC-HYDRIN) 12 % lotion APPLY TOPICALLY 2 TIMES A DAY  5    metoprolol succinate (TOPROL XL) 25 MG extended release tablet TAKE 1 TABLET BY MOUTH AT BEDTIME  5    spironolactone (ALDACTONE) 25 MG tablet TAKE 1 TABLET BY MOUTH EVERY DAY  5    acetaminophen (TYLENOL) 500 MG tablet Take 500 mg by mouth every 6 hours as needed for Pain.  simvastatin (ZOCOR) 40 MG tablet Take 40 mg by mouth every other day        No current facility-administered medications for this visit.      Facility-Administered Medications Ordered in Other Visits   Medication Dose Route Frequency Provider Last Rate Last Admin    perflutren lipid microspheres (DEFINITY) injection 1.65 mg  1.5 mL IntraVENous ONCE PRN ELSA Rg Past Medical History:  Past Medical History:   Diagnosis Date    Aortic insufficiency 06/2020    Aortic stenosis 06/2020    Arthritis     Heart murmur     Hyperlipidemia     Hypertension     Night sweats        Past Surgical History:  Past Surgical History:   Procedure Laterality Date    AORTIC VALVE REPLACEMENT N/A 11/18/2020    TRANSCATHETER AORTIC VALVE REPLACEMENT FEMORAL APPROACH performed by Moncho Noonan MD at [facility] 22 CATARACT REMOVAL WITH IMPLANT  2010    Bilateral     CHOLECYSTECTOMY      COLONOSCOPY      HYSTERECTOMY      PARTIAL    TRANSESOPHAGEAL ECHOCARDIOGRAM  08/20/2020    DR Ocampo    TRANSESOPHAGEAL ECHOCARDIOGRAM N/A 11/18/2020    TRANSESOPHAGEAL ECHOCARDIOGRAM performed by Moncho Noonan MD at [facility] History:  Social History     Socioeconomic History    Marital status: Single     Spouse name: Not on file    Number of children: Not on file    Years of education: Not on file    Highest education level: Not on file   Occupational History    Not on file   Tobacco Use    Smoking status: Never Smoker    Smokeless tobacco: Never Used   Vaping Use    Vaping Use: Never used   Substance and Sexual Activity    Alcohol use: No    Drug use: No    Sexual activity: Yes   Other Topics Concern    Not on file   Social History Narrative    Not on file     Social Determinants of Health     Financial Resource Strain:     Difficulty of Paying Living Expenses: Not on file   Food Insecurity:     Worried About Running Out of Food in the Last Year: Not on file    Wally of Food in the Last Year: Not on file   Transportation Needs:     Lack of Transportation (Medical): Not on file    Lack of Transportation (Non-Medical):  Not on file   Physical Activity:     Days of Exercise per Week: Not on file    Minutes of Exercise per Session: Not on file   Stress:     Feeling of Stress : Not on file   Social Connections:     Frequency of Communication with Friends and Family: Not on file    Frequency of Social Gatherings with Friends and Family: Not on file    Attends Anabaptism Services: Not on file    Active Member of Clubs or Organizations: Not on file    Attends Club or Organization Meetings: Not on file    Marital Status: Not on file   Intimate Partner Violence:     Fear of Current or Ex-Partner: Not on file    Emotionally Abused: Not on file    Physically Abused: Not on file    Sexually Abused: Not on file   Housing Stability:     Unable to Pay for Housing in the Last Year: Not on file    Number of [place] in the Last Year: Not on file    Unstable Housing in the Last Year: Not on file       Family History:  Family History   Family history unknown: Yes       Review of Systems:  Constitutional: Denies fevers, chills, or weight loss. HEENT: Denies visual changes or hearing loss. Heart: As per HPI. Lungs: Denies shortness of breath, cough, or wheezing. Gastrointestinal: Denies nausea, vomiting, constipation, or diarrhea. Genitourinary: dysuria or hematuria. Psychiatric: Patient denies anxiety or depression. Neurologic: Patient denies weakness of the extremities, dizziness, or headaches. All other ROS checked and found to be negative. Objective:  Vitals BP (!) 140/70 (Site: Right Upper Arm, Position: Sitting, Cuff Size: Medium Adult)   Pulse 60   Temp 98.4 °F (36.9 °C) (Oral)   Ht 5' 4\" (1.626 m)   Wt 201 lb (91.2 kg)   BMI 34.50 kg/m²   General Appearance: Pleasant 78y.o. year old female who appears stated age. Communicates well, no acute distress. HEENT: Head is normocephalic, atraumatic. EOMs intact, PERRL. Trachea midline. Lungs: Normal respiratory rate and normal effort. She is not in respiratory distress. Breath sounds clear to auscultation. No wheezes. Heart: Normal rate. Regular rhythm. S1 normal and S2 normal. No murmur. Chest: Symmetric chest wall expansion. Extremities: Normal range of motion. No edema.    Neurological: Patient is alert and oriented to person, place and time. Skin: Warm and dry. Abdomen: Abdomen is soft and non-distended. Bowel sounds are normal.    Pulses: Distal pulses are intact. Skin: Warm and dry without lesions. Assessment:   1. Mixed aortic valve disease (mod AS, severe AI) s/p TAVR - doing well. Mean gradient 9 mmHg by echo today  2. NYHA class I      Plan:   1. Follow up in structural clinic 11/22/22 for two year visit  2. Follow up with Jett Jha and Terrence as scheduled  3. Continue exercise as tolerated  4.  SBE prophylaxis reviewed      Electronically signed by Vale Stover PA-C on 11/23/2021 at 12:10 PM

## 2025-08-06 PROBLEM — Z95.2 HISTORY OF TRANSCATHETER AORTIC VALVE REPLACEMENT (TAVR): Status: ACTIVE | Noted: 2025-08-06

## 2025-08-06 PROBLEM — I35.0 SEVERE AORTIC STENOSIS: Status: RESOLVED | Noted: 2020-11-18 | Resolved: 2025-08-06

## 2025-08-07 ENCOUNTER — OFFICE VISIT (OUTPATIENT)
Dept: CARDIOLOGY CLINIC | Age: 80
End: 2025-08-07
Payer: MEDICARE

## 2025-08-07 VITALS
BODY MASS INDEX: 33.29 KG/M2 | WEIGHT: 199.8 LBS | HEART RATE: 59 BPM | OXYGEN SATURATION: 99 % | RESPIRATION RATE: 16 BRPM | DIASTOLIC BLOOD PRESSURE: 63 MMHG | SYSTOLIC BLOOD PRESSURE: 115 MMHG | TEMPERATURE: 97.5 F | HEIGHT: 65 IN

## 2025-08-07 DIAGNOSIS — Z95.2 HISTORY OF TRANSCATHETER AORTIC VALVE REPLACEMENT (TAVR): Primary | ICD-10-CM

## 2025-08-07 PROBLEM — I10 HTN (HYPERTENSION): Status: ACTIVE | Noted: 2025-08-07

## 2025-08-07 PROCEDURE — 1123F ACP DISCUSS/DSCN MKR DOCD: CPT | Performed by: INTERNAL MEDICINE

## 2025-08-07 PROCEDURE — 1160F RVW MEDS BY RX/DR IN RCRD: CPT | Performed by: INTERNAL MEDICINE

## 2025-08-07 PROCEDURE — G2211 COMPLEX E/M VISIT ADD ON: HCPCS | Performed by: INTERNAL MEDICINE

## 2025-08-07 PROCEDURE — 1159F MED LIST DOCD IN RCRD: CPT | Performed by: INTERNAL MEDICINE

## 2025-08-07 PROCEDURE — 99204 OFFICE O/P NEW MOD 45 MIN: CPT | Performed by: INTERNAL MEDICINE

## 2025-08-07 PROCEDURE — 93000 ELECTROCARDIOGRAM COMPLETE: CPT | Performed by: INTERNAL MEDICINE

## 2025-08-07 PROCEDURE — 3074F SYST BP LT 130 MM HG: CPT | Performed by: INTERNAL MEDICINE

## 2025-08-07 PROCEDURE — 3078F DIAST BP <80 MM HG: CPT | Performed by: INTERNAL MEDICINE

## 2025-08-07 RX ORDER — ROSUVASTATIN CALCIUM 10 MG/1
10 TABLET, COATED ORAL DAILY
COMMUNITY

## 2025-08-07 RX ORDER — FOLIC ACID 1 MG/1
1 TABLET ORAL DAILY
COMMUNITY

## 2025-08-26 ENCOUNTER — HOSPITAL ENCOUNTER (OUTPATIENT)
Dept: CARDIOLOGY | Age: 80
Discharge: HOME | End: 2025-08-28
Attending: INTERNAL MEDICINE
Payer: MEDICARE

## 2025-08-26 ENCOUNTER — RESULTS FOLLOW-UP (OUTPATIENT)
Dept: CARDIOLOGY CLINIC | Age: 80
End: 2025-08-26

## 2025-08-26 VITALS
DIASTOLIC BLOOD PRESSURE: 63 MMHG | WEIGHT: 199 LBS | BODY MASS INDEX: 33.15 KG/M2 | HEIGHT: 65 IN | SYSTOLIC BLOOD PRESSURE: 115 MMHG

## 2025-08-26 DIAGNOSIS — Z95.2 HISTORY OF TRANSCATHETER AORTIC VALVE REPLACEMENT (TAVR): ICD-10-CM

## 2025-08-26 LAB
ECHO AO ASC DIAM: 3.6 CM
ECHO AO ASCENDING AORTA INDEX: 1.83 CM/M2
ECHO AR MAX VEL PISA: 4.4 M/S
ECHO AV AREA PEAK VELOCITY: 1.4 CM2
ECHO AV AREA VTI: 1.4 CM2
ECHO AV AREA/BSA PEAK VELOCITY: 0.7 CM2/M2
ECHO AV AREA/BSA VTI: 0.7 CM2/M2
ECHO AV MEAN GRADIENT: 8 MMHG
ECHO AV MEAN VELOCITY: 1.4 M/S
ECHO AV PEAK GRADIENT: 16 MMHG
ECHO AV PEAK VELOCITY: 2 M/S
ECHO AV REGURGITANT PHT: 721.4 MS
ECHO AV VELOCITY RATIO: 0.4
ECHO AV VTI: 50 CM
ECHO BSA: 2.03 M2
ECHO EST RA PRESSURE: 3 MMHG
ECHO LA DIAMETER INDEX: 1.83 CM/M2
ECHO LA DIAMETER: 3.6 CM
ECHO LA VOL A-L A2C: 41 ML (ref 22–52)
ECHO LA VOL A-L A4C: 35 ML (ref 22–52)
ECHO LA VOL MOD A2C: 41 ML (ref 22–52)
ECHO LA VOL MOD A4C: 33 ML (ref 22–52)
ECHO LA VOLUME AREA LENGTH: 38 ML
ECHO LA VOLUME INDEX A-L A2C: 21 ML/M2 (ref 16–34)
ECHO LA VOLUME INDEX A-L A4C: 18 ML/M2 (ref 16–34)
ECHO LA VOLUME INDEX AREA LENGTH: 19 ML/M2 (ref 16–34)
ECHO LA VOLUME INDEX MOD A2C: 21 ML/M2 (ref 16–34)
ECHO LA VOLUME INDEX MOD A4C: 17 ML/M2 (ref 16–34)
ECHO LV EF PHYSICIAN: 60 %
ECHO LV FRACTIONAL SHORTENING: 30 % (ref 28–44)
ECHO LV INTERNAL DIMENSION DIASTOLE INDEX: 2.03 CM/M2
ECHO LV INTERNAL DIMENSION DIASTOLIC: 4 CM (ref 3.9–5.3)
ECHO LV INTERNAL DIMENSION SYSTOLIC INDEX: 1.42 CM/M2
ECHO LV INTERNAL DIMENSION SYSTOLIC: 2.8 CM
ECHO LV IVSD: 1.2 CM (ref 0.6–0.9)
ECHO LV IVSS: 1.5 CM
ECHO LV MASS 2D: 155.4 G (ref 67–162)
ECHO LV MASS INDEX 2D: 78.9 G/M2 (ref 43–95)
ECHO LV POSTERIOR WALL DIASTOLIC: 1.1 CM (ref 0.6–0.9)
ECHO LV POSTERIOR WALL SYSTOLIC: 1.6 CM
ECHO LV RELATIVE WALL THICKNESS RATIO: 0.55
ECHO LVOT AREA: 3.1 CM2
ECHO LVOT AV VTI INDEX: 0.44
ECHO LVOT DIAM: 2 CM
ECHO LVOT MEAN GRADIENT: 2 MMHG
ECHO LVOT PEAK GRADIENT: 3 MMHG
ECHO LVOT PEAK VELOCITY: 0.8 M/S
ECHO LVOT STROKE VOLUME INDEX: 35.1 ML/M2
ECHO LVOT SV: 69.1 ML
ECHO LVOT VTI: 22 CM
ECHO MV "A" WAVE DURATION: 133.8 MSEC
ECHO MV A VELOCITY: 1.22 M/S
ECHO MV AREA PHT: 2.3 CM2
ECHO MV AREA VTI: 1.5 CM2
ECHO MV E DECELERATION TIME (DT): 253.6 MS
ECHO MV E VELOCITY: 1.09 M/S
ECHO MV E/A RATIO: 0.89
ECHO MV LVOT VTI INDEX: 2.12
ECHO MV MAX VELOCITY: 1.2 M/S
ECHO MV MEAN GRADIENT: 1 MMHG
ECHO MV MEAN VELOCITY: 0.5 M/S
ECHO MV PEAK GRADIENT: 6 MMHG
ECHO MV PRESSURE HALF TIME (PHT): 93.9 MS
ECHO MV VTI: 46.7 CM
ECHO PULMONARY ARTERY END DIASTOLIC PRESSURE: 3 MMHG
ECHO PV MAX VELOCITY: 0.6 M/S
ECHO PV MEAN GRADIENT: 1 MMHG
ECHO PV MEAN VELOCITY: 0.4 M/S
ECHO PV PEAK GRADIENT: 2 MMHG
ECHO PV REGURGITANT MAX VELOCITY: 0.9 M/S
ECHO PV VTI: 17.3 CM
ECHO RIGHT VENTRICULAR SYSTOLIC PRESSURE (RVSP): 19 MMHG
ECHO RV INTERNAL DIMENSION: 3.1 CM
ECHO TV REGURGITANT MAX VELOCITY: 2.02 M/S
ECHO TV REGURGITANT PEAK GRADIENT: 16 MMHG

## 2025-08-26 PROCEDURE — 93306 TTE W/DOPPLER COMPLETE: CPT

## 2025-08-26 PROCEDURE — 93306 TTE W/DOPPLER COMPLETE: CPT | Performed by: INTERNAL MEDICINE

## (undated) DEVICE — GLOVE ORANGE PI 8   MSG9080

## (undated) DEVICE — SURGICAL PROCEDURE PACK BASIC

## (undated) DEVICE — CATHETER ANGIO PIG 0.045 IN 5 FRX110 CM VENTRICULAR EXPO

## (undated) DEVICE — INTENDED FOR TISSUE SEPARATION, AND OTHER PROCEDURES THAT REQUIRE A SHARP SURGICAL BLADE TO PUNCTURE OR CUT.: Brand: BARD-PARKER ® STAINLESS STEEL BLADES

## (undated) DEVICE — INTRO SHEATH W/6.0FRX10CMX.0385IN

## (undated) DEVICE — Z DUP USE 2257490 ADHESIVE SKIN CLSRE 036ML TPCL 2CTL CNCRLTE HIGH VSCSTY DRMB

## (undated) DEVICE — ALCOHOL 70% RUBBING 16OZ

## (undated) DEVICE — GLOVE SURG SZ 6 THK91MIL LTX FREE SYN POLYISOPRENE ANTI

## (undated) DEVICE — DRAPE,REIN 53X77,STERILE: Brand: MEDLINE

## (undated) DEVICE — SNAP KOVER: Brand: UNBRANDED

## (undated) DEVICE — TUBING PRSS MON L12IN PVC RIG NONEXPANDING M TO FEM CONN

## (undated) DEVICE — SYRINGE MED 50ML LUERLOCK TIP

## (undated) DEVICE — BOWL MED L 32OZ PLAS W/ MOLD GRAD EZ OPN PEEL PCH

## (undated) DEVICE — SET CARDIAC I

## (undated) DEVICE — GLOVE ORANGE PI 7   MSG9070

## (undated) DEVICE — DRESSING TRNSPAR W4XL55IN ACRYL SUP FLM W ADH WTRPRF OPSITE

## (undated) DEVICE — SYRINGE MED 10ML LUERLOCK TIP W/O SFTY DISP

## (undated) DEVICE — GUIDEWIRE VASC L145CM 0.035IN J TIP L3MM PTFE FIX COR NAMIC

## (undated) DEVICE — HOLDER NDL WEBST SNAG FREE

## (undated) DEVICE — GOWN,SIRUS,FABRNF,XL,20/CS: Brand: MEDLINE

## (undated) DEVICE — AMPLATZ EXTRA STIFF WIRE GUIDE: Brand: AMPLATZ

## (undated) DEVICE — PICO 7 10CM X 20CM: Brand: PICO™ 7

## (undated) DEVICE — 3M™ IOBAN™ 2 ANTIMICROBIAL INCISE DRAPE 6650EZ: Brand: IOBAN™ 2

## (undated) DEVICE — MEDI-TRACE CADENCE ADULT, PRE-CONNECT  DEFIBRILLATION ELECTRODE (10 PR/PK) - PHYSIO-CONTROL: Brand: MEDI-TRACE CADENCE

## (undated) DEVICE — TOWEL,OR,DSP,ST,BLUE,STD,6/PK,12PK/CS: Brand: MEDLINE

## (undated) DEVICE — BLADE CLIPPER GEN PURP NS

## (undated) DEVICE — ANGIO-SEAL VIP VASCULAR CLOSURE DEVICE: Brand: ANGIO-SEAL

## (undated) DEVICE — SET TRNQT W/ STD 7IN 12FR 5 TB 1 SNR DLP

## (undated) DEVICE — LABEL MED CARD SURG 4 IN PANEL STRL

## (undated) DEVICE — DRAPE, MULTI FENESTRATED, HYBRID OR, STE: Brand: MEDLINE

## (undated) DEVICE — CLOTH SURG PREP PREOPERATIVE CHLORHEXIDINE GLUC 2% READYPREP

## (undated) DEVICE — ANGIOPLASTY ADD-ON PACK: Brand: MEDLINE INDUSTRIES, INC.

## (undated) DEVICE — GLOVE ORANGE PI 7 1/2   MSG9075

## (undated) DEVICE — PATIENT RETURN ELECTRODE, SINGLE-USE, CONTACT QUALITY MONITORING, ADULT, WITH 9FT CORD, FOR PATIENTS WEIGING OVER 33LBS. (15KG): Brand: MEGADYNE

## (undated) DEVICE — APPLICATOR MEDICATED 26 CC SOLUTION HI LT ORNG CHLORAPREP

## (undated) DEVICE — GLOVE SURG SZ 65 THK91MIL LTX FREE SYN POLYISOPRENE

## (undated) DEVICE — KIT MED IMAG CNTRST AGNT W/ IOPAMIDOL REUSE

## (undated) DEVICE — GUIDEWIRE VASC L260CM DIA0.035IN TAPR L11CM FLPY TIP L4CM

## (undated) DEVICE — KIT HND CTRL 3 W STPCOCK ROT END 54IN PREM HI PRSS TBNG AT

## (undated) DEVICE — DRAPE SHEET: Brand: UNBRANDED

## (undated) DEVICE — GUIDEWIRE ANGIO L150CM OD0.035IN STR FIX PTFE SLD SUPP

## (undated) DEVICE — KIT MFLD ISOLATN NACL CNTRST PRT TBNG SPIK W/ PRSS TRNSDUC

## (undated) DEVICE — DRESSING FOAM W22XL25CM FILVE LAYR FOAM DP DEF SAFETAC

## (undated) DEVICE — AGENT HEMSTAT W4XL8IN OXIDIZED REGENERATED CELOS ABSRB

## (undated) DEVICE — Z INACTIVE USE 2641837 CLIP LIG M BLU TI HRT SHP WIRE HORZ 600 PER BX

## (undated) DEVICE — CATH ANGIO 5FR .045IN AL1 100CM EXPO

## (undated) DEVICE — PADDLE INTERN DEFIB CHILD

## (undated) DEVICE — SOLUTION IV IRRIG WATER 1000ML POUR BRL 2F7114

## (undated) DEVICE — PACK SURG CARDIAC CATH DYNJ38860] MEDLINE INDUSTRIES INC]

## (undated) DEVICE — GOWN,SIRUS,FABRNF,L,20/CS: Brand: MEDLINE

## (undated) DEVICE — LOOP VES W25MM THK1MM MAXI RED SIL FLD REPELLENT 100 PER

## (undated) DEVICE — NEEDLE SPNL 20GA L3.5IN YEL HUB S STL REG WALL FIT STYL W/

## (undated) DEVICE — SYRINGE 20ML LL S/C 50

## (undated) DEVICE — STAPLER SKIN L39MM DIA0.53MM CRWN 5.7MM S STL FIX HD PROX

## (undated) DEVICE — INTRODUCER SHTH 6FR L12CM DIA0.038IN HEMSTAS CLOSE TOL

## (undated) DEVICE — CATHETER DIAG AD 5FR L100CM COR GRY HYDRPHLC NYL MPA 2 W/ 2

## (undated) DEVICE — GUIDEWIRE VASC L260CM 0.035IN J TIP L3MM PTFE FIX COR NAMIC

## (undated) DEVICE — CATHETER DIAG 5FR L110CM ID0.045IN VENT VASC PGTL 145 EXPO

## (undated) DEVICE — COVER,TABLE,60X90,STERILE: Brand: MEDLINE

## (undated) DEVICE — COVER PRB W14XL147CM TELESCOPICALLY FLD EXT LEN CIV-FLEX

## (undated) DEVICE — LARGE BORE STOPCOCK WITH ROTATING MALE LUER LOCK

## (undated) DEVICE — PERCUTANEOUS ENTRY THINWALL NEEDLE  ONE-PART: Brand: COOK

## (undated) DEVICE — SET INTRO SHTH 5FR L45CM GRY INTEGR SIDE PRT HAEMOSTASIS

## (undated) DEVICE — MEDIA CONTRAST RX ISOVUE-300 61% 30ML VIALS

## (undated) DEVICE — SOLUTION IV 1000ML 0.9% SOD CHL PH 5 INJ USP VIAFLX PLAS

## (undated) DEVICE — SET CARDIAC II

## (undated) DEVICE — TAPE ADH W2INXL10YD PLAS TRNSPAR H2O RESIST HYPOALRG CURAD